# Patient Record
Sex: MALE | Race: WHITE | NOT HISPANIC OR LATINO | Employment: FULL TIME | URBAN - METROPOLITAN AREA
[De-identification: names, ages, dates, MRNs, and addresses within clinical notes are randomized per-mention and may not be internally consistent; named-entity substitution may affect disease eponyms.]

---

## 2021-04-15 ENCOUNTER — LAB (OUTPATIENT)
Dept: LAB | Facility: HOSPITAL | Age: 51
End: 2021-04-15

## 2021-04-15 ENCOUNTER — OFFICE VISIT (OUTPATIENT)
Dept: FAMILY MEDICINE CLINIC | Facility: CLINIC | Age: 51
End: 2021-04-15

## 2021-04-15 VITALS
OXYGEN SATURATION: 98 % | SYSTOLIC BLOOD PRESSURE: 140 MMHG | DIASTOLIC BLOOD PRESSURE: 90 MMHG | TEMPERATURE: 96.8 F | BODY MASS INDEX: 34.75 KG/M2 | HEIGHT: 72 IN | HEART RATE: 86 BPM | WEIGHT: 256.6 LBS

## 2021-04-15 DIAGNOSIS — I10 ESSENTIAL HYPERTENSION: ICD-10-CM

## 2021-04-15 DIAGNOSIS — Z11.59 ENCOUNTER FOR HEPATITIS C SCREENING TEST FOR LOW RISK PATIENT: ICD-10-CM

## 2021-04-15 DIAGNOSIS — Z12.11 SCREEN FOR COLON CANCER: Primary | ICD-10-CM

## 2021-04-15 DIAGNOSIS — Z13.9 ENCOUNTER FOR HEALTH-RELATED SCREENING: ICD-10-CM

## 2021-04-15 LAB
ANION GAP SERPL CALCULATED.3IONS-SCNC: 9 MMOL/L (ref 5–15)
BUN SERPL-MCNC: 13 MG/DL (ref 6–20)
BUN/CREAT SERPL: 16.5 (ref 7–25)
CALCIUM SPEC-SCNC: 9.7 MG/DL (ref 8.6–10.5)
CHLORIDE SERPL-SCNC: 103 MMOL/L (ref 98–107)
CHOLEST SERPL-MCNC: 169 MG/DL (ref 0–200)
CO2 SERPL-SCNC: 27 MMOL/L (ref 22–29)
CREAT SERPL-MCNC: 0.79 MG/DL (ref 0.76–1.27)
GFR SERPL CREATININE-BSD FRML MDRD: 104 ML/MIN/1.73
GLUCOSE SERPL-MCNC: 108 MG/DL (ref 65–99)
HCV AB SER DONR QL: NORMAL
HDLC SERPL-MCNC: 43 MG/DL (ref 40–60)
LDLC SERPL CALC-MCNC: 102 MG/DL (ref 0–100)
LDLC/HDLC SERPL: 2.31 {RATIO}
POTASSIUM SERPL-SCNC: 4.6 MMOL/L (ref 3.5–5.2)
SODIUM SERPL-SCNC: 139 MMOL/L (ref 136–145)
T4 FREE SERPL-MCNC: 1.02 NG/DL (ref 0.93–1.7)
TRIGL SERPL-MCNC: 133 MG/DL (ref 0–150)
TSH SERPL DL<=0.05 MIU/L-ACNC: 2.72 UIU/ML (ref 0.27–4.2)
VLDLC SERPL-MCNC: 24 MG/DL (ref 5–40)

## 2021-04-15 PROCEDURE — 86803 HEPATITIS C AB TEST: CPT

## 2021-04-15 PROCEDURE — 36415 COLL VENOUS BLD VENIPUNCTURE: CPT

## 2021-04-15 PROCEDURE — 99396 PREV VISIT EST AGE 40-64: CPT | Performed by: NURSE PRACTITIONER

## 2021-04-15 PROCEDURE — 80048 BASIC METABOLIC PNL TOTAL CA: CPT

## 2021-04-15 PROCEDURE — 80061 LIPID PANEL: CPT

## 2021-04-15 PROCEDURE — 84443 ASSAY THYROID STIM HORMONE: CPT

## 2021-04-15 PROCEDURE — 84439 ASSAY OF FREE THYROXINE: CPT

## 2021-04-15 RX ORDER — ASPIRIN 81 MG/1
TABLET ORAL
COMMUNITY
Start: 2014-08-22

## 2021-04-15 RX ORDER — OLMESARTAN MEDOXOMIL 20 MG/1
TABLET, FILM COATED ORAL
COMMUNITY
Start: 2014-09-19 | End: 2021-05-27

## 2021-04-15 RX ORDER — FLUTICASONE PROPIONATE 50 MCG
SPRAY, SUSPENSION (ML) NASAL
COMMUNITY
Start: 2014-08-21

## 2021-04-15 RX ORDER — MULTIPLE VITAMINS W/ MINERALS TAB 9MG-400MCG
1 TAB ORAL DAILY
COMMUNITY

## 2021-04-15 RX ORDER — HYDROCHLOROTHIAZIDE 12.5 MG/1
12.5 TABLET ORAL DAILY
Qty: 30 TABLET | Refills: 0 | Status: SHIPPED | OUTPATIENT
Start: 2021-04-15 | End: 2021-05-27 | Stop reason: SDUPTHER

## 2021-04-15 NOTE — PROGRESS NOTES
"Subjective        Oliver Leone is a 50 y.o. male.     Chief Complaint   Patient presents with   • Hypertension     new pt establishment       History of Present Illness  Patient is new to this office. He has history of hypertension in the past and has it now.     Health screening; history of hypertension,  morbid obesity.  He needs colonoscopy. He has went from 296 to 256 over 7 months . He is exercising and eating well. Father had cAd at age 40's. He was  in the past is retired. He dips and occassional cigar use.   He has current eye exam using cheaters. He is current with dental.   Not interested in any other cancer screenings.                   The following portions of the patient's history were reviewed and updated as appropriate: allergies, current medications, past family history, past medical history, past social history, past surgical history and problem list.      Current Outpatient Medications:   •  aspirin (ASPIR) 81 MG EC tablet, ASPIRIN 81 81 MG TBEC, Disp: , Rfl:   •  multivitamin with minerals (MULTIVITAMIN ADULTS PO), Take 1 tablet by mouth Daily., Disp: , Rfl:   •  olmesartan (Benicar) 20 MG tablet, BENICAR 20 MG TABS, Disp: , Rfl:   •  fluticasone (Flonase) 50 MCG/ACT nasal spray, FLONASE 50 MCG/ACT NASAL SUSPENSION, Disp: , Rfl:   •  hydroCHLOROthiazide (HYDRODIURIL) 12.5 MG tablet, Take 1 tablet by mouth Daily., Disp: 30 tablet, Rfl: 0    No results found for this or any previous visit (from the past 4032 hour(s)).      Review of Systems   HENT:        Decreased hearing right ear       Objective     /90   Pulse 86   Temp 96.8 °F (36 °C) (Infrared)   Ht 182.9 cm (72\")   Wt 116 kg (256 lb 9.6 oz)   SpO2 98%   BMI 34.80 kg/m²     Physical Exam  Vitals and nursing note reviewed.   Constitutional:       Appearance: Normal appearance.   HENT:      Head: Normocephalic.      Right Ear: External ear normal.      Left Ear: External ear normal.      Nose: Nose normal.      " Mouth/Throat:      Mouth: Mucous membranes are moist.   Eyes:      Conjunctiva/sclera: Conjunctivae normal.      Pupils: Pupils are equal, round, and reactive to light.   Cardiovascular:      Rate and Rhythm: Normal rate and regular rhythm.      Pulses: Normal pulses.      Heart sounds: Normal heart sounds.   Pulmonary:      Effort: Pulmonary effort is normal.      Breath sounds: Normal breath sounds.   Abdominal:      General: Abdomen is flat. Bowel sounds are normal.      Palpations: Abdomen is soft.   Musculoskeletal:         General: Normal range of motion.      Cervical back: Normal range of motion and neck supple.   Skin:     General: Skin is warm and dry.   Neurological:      General: No focal deficit present.      Mental Status: He is alert and oriented to person, place, and time.   Psychiatric:         Mood and Affect: Mood normal.         Behavior: Behavior normal.         Thought Content: Thought content normal.         Judgment: Judgment normal.         Result Review :                Assessment/Plan    Diagnoses and all orders for this visit:    1. Screen for colon cancer (Primary)  -     Amb referral for Screening Colonoscopy    2. Essential hypertension    3. Encounter for health-related screening  -     Lipid Panel; Future  -     Basic Metabolic Panel; Future  -     TSH; Future  -     T4, free; Future    4. Encounter for hepatitis C screening test for low risk patient  -     Hepatitis C antibody; Future    Other orders  -     hydroCHLOROthiazide (HYDRODIURIL) 12.5 MG tablet; Take 1 tablet by mouth Daily.  Dispense: 30 tablet; Refill: 0      Patient Instructions   Follow up on labs  Monitor blood pressure 3 times a week. Eat healthy and exercise      Follow Up   Return in about 1 month (around 5/15/2021).    Patient was given instructions and counseling regarding his condition or for health maintenance advice. Please see specific information pulled into the AVS if appropriate.     Carey Miles,  APRN    04/15/21

## 2021-05-27 ENCOUNTER — OFFICE VISIT (OUTPATIENT)
Dept: FAMILY MEDICINE CLINIC | Facility: CLINIC | Age: 51
End: 2021-05-27

## 2021-05-27 VITALS
SYSTOLIC BLOOD PRESSURE: 130 MMHG | HEIGHT: 72 IN | DIASTOLIC BLOOD PRESSURE: 90 MMHG | HEART RATE: 72 BPM | WEIGHT: 258 LBS | OXYGEN SATURATION: 97 % | TEMPERATURE: 96.8 F | BODY MASS INDEX: 34.95 KG/M2

## 2021-05-27 DIAGNOSIS — I15.8 OTHER SECONDARY HYPERTENSION: Primary | ICD-10-CM

## 2021-05-27 PROBLEM — Z11.59 ENCOUNTER FOR HEPATITIS C SCREENING TEST FOR LOW RISK PATIENT: Status: RESOLVED | Noted: 2021-04-15 | Resolved: 2021-05-27

## 2021-05-27 PROCEDURE — 99213 OFFICE O/P EST LOW 20 MIN: CPT | Performed by: NURSE PRACTITIONER

## 2021-05-27 RX ORDER — HYDROCHLOROTHIAZIDE 12.5 MG/1
12.5 TABLET ORAL DAILY
Qty: 90 TABLET | Refills: 1 | Status: SHIPPED | OUTPATIENT
Start: 2021-05-27 | End: 2021-11-29 | Stop reason: SDUPTHER

## 2021-05-27 NOTE — PROGRESS NOTES
Subjective        Oliver Leone is a 50 y.o. male.     Chief Complaint   Patient presents with   • Hypertension     1 month f/u       History of Present Illness  Patient is here for one month follow up on hypertension he is checking every 3 days 160/90 down to 130's /70.    Hypertension: he is taking hctz 12.5 mg . He said is high after he runs. He has lost 50 lbs with exercise and eating changes. Denies chest pain with activity.         The following portions of the patient's history were reviewed and updated as appropriate: allergies, current medications, past family history, past medical history, past social history, past surgical history and problem list.      Current Outpatient Medications:   •  aspirin (ASPIR) 81 MG EC tablet, ASPIRIN 81 81 MG TBEC, Disp: , Rfl:   •  fluticasone (Flonase) 50 MCG/ACT nasal spray, FLONASE 50 MCG/ACT NASAL SUSPENSION, Disp: , Rfl:   •  multivitamin with minerals (MULTIVITAMIN ADULTS PO), Take 1 tablet by mouth Daily., Disp: , Rfl:   •  hydroCHLOROthiazide (HYDRODIURIL) 12.5 MG tablet, Take 1 tablet by mouth Daily., Disp: 90 tablet, Rfl: 1    Recent Results (from the past 4032 hour(s))   Lipid Panel    Collection Time: 04/15/21 10:18 AM    Specimen: Blood   Result Value Ref Range    Total Cholesterol 169 0 - 200 mg/dL    Triglycerides 133 0 - 150 mg/dL    HDL Cholesterol 43 40 - 60 mg/dL    LDL Cholesterol  102 (H) 0 - 100 mg/dL    VLDL Cholesterol 24 5 - 40 mg/dL    LDL/HDL Ratio 2.31    Basic Metabolic Panel    Collection Time: 04/15/21 10:18 AM    Specimen: Blood   Result Value Ref Range    Glucose 108 (H) 65 - 99 mg/dL    BUN 13 6 - 20 mg/dL    Creatinine 0.79 0.76 - 1.27 mg/dL    Sodium 139 136 - 145 mmol/L    Potassium 4.6 3.5 - 5.2 mmol/L    Chloride 103 98 - 107 mmol/L    CO2 27.0 22.0 - 29.0 mmol/L    Calcium 9.7 8.6 - 10.5 mg/dL    eGFR Non African Amer 104 >60 mL/min/1.73    BUN/Creatinine Ratio 16.5 7.0 - 25.0    Anion Gap 9.0 5.0 - 15.0 mmol/L   TSH    Collection  "Time: 04/15/21 10:18 AM    Specimen: Blood   Result Value Ref Range    TSH 2.720 0.270 - 4.200 uIU/mL   T4, free    Collection Time: 04/15/21 10:18 AM    Specimen: Blood   Result Value Ref Range    Free T4 1.02 0.93 - 1.70 ng/dL   Hepatitis C antibody    Collection Time: 04/15/21 10:18 AM    Specimen: Blood   Result Value Ref Range    Hepatitis C Ab Non-Reactive Non-Reactive         Review of Systems    Objective     /90   Pulse 72   Temp 96.8 °F (36 °C) (Infrared)   Ht 182.9 cm (72\")   Wt 117 kg (258 lb)   SpO2 97%   BMI 34.99 kg/m²     Physical Exam  Vitals and nursing note reviewed.   Constitutional:       Appearance: Normal appearance.   HENT:      Head: Normocephalic.      Right Ear: External ear normal.      Left Ear: External ear normal.      Nose: Nose normal.      Mouth/Throat:      Mouth: Mucous membranes are moist.   Eyes:      Pupils: Pupils are equal, round, and reactive to light.   Cardiovascular:      Rate and Rhythm: Normal rate and regular rhythm.      Pulses: Normal pulses.      Heart sounds: Normal heart sounds.   Pulmonary:      Effort: Pulmonary effort is normal.      Breath sounds: Normal breath sounds.   Abdominal:      General: Bowel sounds are normal.      Palpations: Abdomen is soft.   Musculoskeletal:      Cervical back: Normal range of motion.   Skin:     General: Skin is warm.   Neurological:      General: No focal deficit present.      Mental Status: He is alert and oriented to person, place, and time.   Psychiatric:         Mood and Affect: Mood normal.         Behavior: Behavior normal.         Thought Content: Thought content normal.         Judgment: Judgment normal.         Result Review :                Assessment/Plan    Diagnoses and all orders for this visit:    1. Other secondary hypertension (Primary)    Other orders  -     hydroCHLOROthiazide (HYDRODIURIL) 12.5 MG tablet; Take 1 tablet by mouth Daily.  Dispense: 90 tablet; Refill: 1      Patient Instructions "   Continue hctz. Monitor blood pressure.   F/u 3 months      Follow Up   Return in about 3 months (around 8/27/2021).    Patient was given instructions and counseling regarding his condition or for health maintenance advice. Please see specific information pulled into the AVS if appropriate.     Carey Miles, APRN    05/27/21

## 2021-07-14 ENCOUNTER — OFFICE VISIT (OUTPATIENT)
Dept: FAMILY MEDICINE CLINIC | Facility: CLINIC | Age: 51
End: 2021-07-14

## 2021-07-14 ENCOUNTER — LAB (OUTPATIENT)
Dept: LAB | Facility: HOSPITAL | Age: 51
End: 2021-07-14

## 2021-07-14 ENCOUNTER — TELEPHONE (OUTPATIENT)
Dept: FAMILY MEDICINE CLINIC | Facility: CLINIC | Age: 51
End: 2021-07-14

## 2021-07-14 VITALS
OXYGEN SATURATION: 95 % | SYSTOLIC BLOOD PRESSURE: 156 MMHG | HEART RATE: 116 BPM | HEIGHT: 72 IN | DIASTOLIC BLOOD PRESSURE: 97 MMHG | BODY MASS INDEX: 34.95 KG/M2 | TEMPERATURE: 100.9 F | WEIGHT: 258 LBS

## 2021-07-14 DIAGNOSIS — R50.9 FEVER, UNSPECIFIED FEVER CAUSE: ICD-10-CM

## 2021-07-14 DIAGNOSIS — R50.9 FEVER, UNSPECIFIED FEVER CAUSE: Primary | ICD-10-CM

## 2021-07-14 DIAGNOSIS — R50.9 HYPERTHERMIA-INDUCED DEFECT: Primary | ICD-10-CM

## 2021-07-14 LAB
ANION GAP SERPL CALCULATED.3IONS-SCNC: 11.6 MMOL/L (ref 5–15)
B PARAPERT DNA SPEC QL NAA+PROBE: NOT DETECTED
B PERT DNA SPEC QL NAA+PROBE: NOT DETECTED
BASOPHILS # BLD AUTO: 0.03 10*3/MM3 (ref 0–0.2)
BASOPHILS NFR BLD AUTO: 0.5 % (ref 0–1.5)
BUN SERPL-MCNC: 15 MG/DL (ref 6–20)
BUN/CREAT SERPL: 18.5 (ref 7–25)
C PNEUM DNA NPH QL NAA+NON-PROBE: NOT DETECTED
CALCIUM SPEC-SCNC: 9.2 MG/DL (ref 8.6–10.5)
CHLORIDE SERPL-SCNC: 100 MMOL/L (ref 98–107)
CO2 SERPL-SCNC: 27.4 MMOL/L (ref 22–29)
CREAT SERPL-MCNC: 0.81 MG/DL (ref 0.76–1.27)
DEPRECATED RDW RBC AUTO: 41.6 FL (ref 37–54)
EOSINOPHIL # BLD AUTO: 0.02 10*3/MM3 (ref 0–0.4)
EOSINOPHIL NFR BLD AUTO: 0.4 % (ref 0.3–6.2)
ERYTHROCYTE [DISTWIDTH] IN BLOOD BY AUTOMATED COUNT: 12.8 % (ref 12.3–15.4)
FLUAV SUBTYP SPEC NAA+PROBE: NOT DETECTED
FLUBV RNA ISLT QL NAA+PROBE: NOT DETECTED
GFR SERPL CREATININE-BSD FRML MDRD: 101 ML/MIN/1.73
GLUCOSE SERPL-MCNC: 89 MG/DL (ref 65–99)
HADV DNA SPEC NAA+PROBE: NOT DETECTED
HCOV 229E RNA SPEC QL NAA+PROBE: NOT DETECTED
HCOV HKU1 RNA SPEC QL NAA+PROBE: NOT DETECTED
HCOV NL63 RNA SPEC QL NAA+PROBE: NOT DETECTED
HCOV OC43 RNA SPEC QL NAA+PROBE: NOT DETECTED
HCT VFR BLD AUTO: 43.2 % (ref 37.5–51)
HGB BLD-MCNC: 14.5 G/DL (ref 13–17.7)
HMPV RNA NPH QL NAA+NON-PROBE: NOT DETECTED
HPIV1 RNA SPEC QL NAA+PROBE: NOT DETECTED
HPIV2 RNA SPEC QL NAA+PROBE: NOT DETECTED
HPIV3 RNA NPH QL NAA+PROBE: NOT DETECTED
HPIV4 P GENE NPH QL NAA+PROBE: NOT DETECTED
IMM GRANULOCYTES # BLD AUTO: 0.02 10*3/MM3 (ref 0–0.05)
IMM GRANULOCYTES NFR BLD AUTO: 0.4 % (ref 0–0.5)
LYMPHOCYTES # BLD AUTO: 0.86 10*3/MM3 (ref 0.7–3.1)
LYMPHOCYTES NFR BLD AUTO: 15.4 % (ref 19.6–45.3)
M PNEUMO IGG SER IA-ACNC: NOT DETECTED
MCH RBC QN AUTO: 30.1 PG (ref 26.6–33)
MCHC RBC AUTO-ENTMCNC: 33.6 G/DL (ref 31.5–35.7)
MCV RBC AUTO: 89.6 FL (ref 79–97)
MONOCYTES # BLD AUTO: 0.93 10*3/MM3 (ref 0.1–0.9)
MONOCYTES NFR BLD AUTO: 16.6 % (ref 5–12)
NEUTROPHILS NFR BLD AUTO: 3.73 10*3/MM3 (ref 1.7–7)
NEUTROPHILS NFR BLD AUTO: 66.7 % (ref 42.7–76)
NRBC BLD AUTO-RTO: 0 /100 WBC (ref 0–0.2)
PLATELET # BLD AUTO: 243 10*3/MM3 (ref 140–450)
PMV BLD AUTO: 9.7 FL (ref 6–12)
POTASSIUM SERPL-SCNC: 3.9 MMOL/L (ref 3.5–5.2)
RBC # BLD AUTO: 4.82 10*6/MM3 (ref 4.14–5.8)
RHINOVIRUS RNA SPEC NAA+PROBE: DETECTED
RSV RNA NPH QL NAA+NON-PROBE: NOT DETECTED
SARS-COV-2 RNA NPH QL NAA+NON-PROBE: DETECTED
SODIUM SERPL-SCNC: 139 MMOL/L (ref 136–145)
WBC # BLD AUTO: 5.59 10*3/MM3 (ref 3.4–10.8)

## 2021-07-14 PROCEDURE — 36415 COLL VENOUS BLD VENIPUNCTURE: CPT

## 2021-07-14 PROCEDURE — 80048 BASIC METABOLIC PNL TOTAL CA: CPT

## 2021-07-14 PROCEDURE — 99214 OFFICE O/P EST MOD 30 MIN: CPT | Performed by: NURSE PRACTITIONER

## 2021-07-14 PROCEDURE — 85025 COMPLETE CBC W/AUTO DIFF WBC: CPT

## 2021-07-14 PROCEDURE — 0202U NFCT DS 22 TRGT SARS-COV-2: CPT

## 2021-07-14 RX ORDER — AZITHROMYCIN 250 MG/1
TABLET, FILM COATED ORAL
Qty: 6 TABLET | Refills: 0 | Status: SHIPPED | OUTPATIENT
Start: 2021-07-14 | End: 2022-06-01

## 2021-07-14 NOTE — TELEPHONE ENCOUNTER
Needs COVID test, was around coworker who tested positive Monday. The patient has fever, cough, vomitting

## 2021-07-14 NOTE — PATIENT INSTRUCTIONS
Follow up on test.   Quarantine   Tylenol for pain and fever  mucinex with fluids water electrolytes  zithromax as prescribed   Gets seen if short of air or symptoms worsen.     
no

## 2021-07-14 NOTE — PROGRESS NOTES
Subjective        Oliver Leone is a 50 y.o. male.     Chief Complaint   Patient presents with   • Generalized Body Aches     x2 days   • Fever   • Cough   • Nasal Congestion       History of Present Illness  Patient is here for fever that started yesterday he has body aches, chills cough, congestion, diarrhea, gets nausea. He has been out of town traveling he did not get covid exposed to covid on Monday.   He is taking extra strength tylenol and mucinex d.   Minor symptoms started on 7/9 thought was allergy or sinus infection.       The following portions of the patient's history were reviewed and updated as appropriate: allergies, current medications, past family history, past medical history, past social history, past surgical history and problem list.      Current Outpatient Medications:   •  aspirin (ASPIR) 81 MG EC tablet, ASPIRIN 81 81 MG TBEC, Disp: , Rfl:   •  fluticasone (Flonase) 50 MCG/ACT nasal spray, FLONASE 50 MCG/ACT NASAL SUSPENSION, Disp: , Rfl:   •  hydroCHLOROthiazide (HYDRODIURIL) 12.5 MG tablet, Take 1 tablet by mouth Daily., Disp: 90 tablet, Rfl: 1  •  multivitamin with minerals (MULTIVITAMIN ADULTS PO), Take 1 tablet by mouth Daily., Disp: , Rfl:   •  azithromycin (Zithromax) 250 MG tablet, Take 2 tablets the first day, then 1 tablet daily for 4 days., Disp: 6 tablet, Rfl: 0    Recent Results (from the past 4032 hour(s))   Lipid Panel    Collection Time: 04/15/21 10:18 AM    Specimen: Blood   Result Value Ref Range    Total Cholesterol 169 0 - 200 mg/dL    Triglycerides 133 0 - 150 mg/dL    HDL Cholesterol 43 40 - 60 mg/dL    LDL Cholesterol  102 (H) 0 - 100 mg/dL    VLDL Cholesterol 24 5 - 40 mg/dL    LDL/HDL Ratio 2.31    Basic Metabolic Panel    Collection Time: 04/15/21 10:18 AM    Specimen: Blood   Result Value Ref Range    Glucose 108 (H) 65 - 99 mg/dL    BUN 13 6 - 20 mg/dL    Creatinine 0.79 0.76 - 1.27 mg/dL    Sodium 139 136 - 145 mmol/L    Potassium 4.6 3.5 - 5.2 mmol/L     "Chloride 103 98 - 107 mmol/L    CO2 27.0 22.0 - 29.0 mmol/L    Calcium 9.7 8.6 - 10.5 mg/dL    eGFR Non African Amer 104 >60 mL/min/1.73    BUN/Creatinine Ratio 16.5 7.0 - 25.0    Anion Gap 9.0 5.0 - 15.0 mmol/L   TSH    Collection Time: 04/15/21 10:18 AM    Specimen: Blood   Result Value Ref Range    TSH 2.720 0.270 - 4.200 uIU/mL   T4, free    Collection Time: 04/15/21 10:18 AM    Specimen: Blood   Result Value Ref Range    Free T4 1.02 0.93 - 1.70 ng/dL   Hepatitis C antibody    Collection Time: 04/15/21 10:18 AM    Specimen: Blood   Result Value Ref Range    Hepatitis C Ab Non-Reactive Non-Reactive         Review of Systems    Objective     /97 (BP Location: Left arm, Patient Position: Sitting, Cuff Size: Adult)   Pulse 116   Temp (!) 100.9 °F (38.3 °C) (Oral)   Ht 182.9 cm (72\")   Wt 117 kg (258 lb)   SpO2 95%   BMI 34.99 kg/m²     Physical Exam  Vitals and nursing note reviewed.   Constitutional:       General: He is not in acute distress.     Appearance: Normal appearance.   HENT:      Head: Normocephalic.      Right Ear: External ear normal.      Left Ear: External ear normal.      Nose: Nasal tenderness, mucosal edema and rhinorrhea present. No congestion.      Right Turbinates: Enlarged and swollen.      Left Turbinates: Enlarged and swollen.      Right Sinus: Maxillary sinus tenderness and frontal sinus tenderness present.      Left Sinus: Maxillary sinus tenderness and frontal sinus tenderness present.   Pulmonary:      Effort: Pulmonary effort is normal.      Breath sounds: Examination of the right-upper field reveals rhonchi. Examination of the right-middle field reveals rhonchi. Examination of the right-lower field reveals rhonchi. Rhonchi present.   Abdominal:      General: Abdomen is flat. Bowel sounds are normal.      Palpations: Abdomen is soft.      Tenderness: There is generalized abdominal tenderness.   Neurological:      Mental Status: He is alert.   Psychiatric:         Attention " and Perception: Attention normal.         Mood and Affect: Mood normal.         Speech: Speech normal.         Behavior: Behavior normal.         Thought Content: Thought content normal.         Cognition and Memory: Cognition normal.         Judgment: Judgment normal.         Result Review :                Assessment/Plan {CC Problem List  Visit Diagnosis  ROS  Review (Popup)  Health Maintenance  Quality  BestPractice  Medications  SmartSets  SnapShot Encounters  Media :23}   Diagnoses and all orders for this visit:    1. Fever, unspecified fever cause (Primary)  -     COVID-19,[facility] ROUTINE, NP/OP SWAB IN TRANSPORT MEDIA OR ESWAB 72 HR TAT - Swab, Nasopharynx; Future  -     Respiratory Panel, PCR (WITHOUT COVID) - Swab, Nasopharynx; Future  -     CBC & Differential; Future  -     Basic Metabolic Panel; Future    Other orders  -     azithromycin (Zithromax) 250 MG tablet; Take 2 tablets the first day, then 1 tablet daily for 4 days.  Dispense: 6 tablet; Refill: 0      Patient Instructions   Follow up on test.   Quarantine   Tylenol for pain and fever  mucinex with fluids water electrolytes  zithromax as prescribed   Gets seen if short of air or symptoms worsen.         Follow Up   No follow-ups on file.    Patient was given instructions and counseling regarding his condition or for health maintenance advice. Please see specific information pulled into the AVS if appropriate.     Carey Miles, APRN    07/14/21

## 2021-07-15 ENCOUNTER — TELEPHONE (OUTPATIENT)
Dept: FAMILY MEDICINE CLINIC | Facility: CLINIC | Age: 51
End: 2021-07-15

## 2021-07-16 PROBLEM — U07.1 COVID-19: Status: ACTIVE | Noted: 2021-07-16

## 2021-07-16 RX ORDER — METHYLPREDNISOLONE SODIUM SUCCINATE 125 MG/2ML
125 INJECTION, POWDER, LYOPHILIZED, FOR SOLUTION INTRAMUSCULAR; INTRAVENOUS AS NEEDED
OUTPATIENT
Start: 2021-07-19

## 2021-07-16 RX ORDER — SODIUM CHLORIDE 9 MG/ML
50 INJECTION, SOLUTION INTRAVENOUS ONCE
OUTPATIENT
Start: 2021-07-19

## 2021-07-16 RX ORDER — DIPHENHYDRAMINE HYDROCHLORIDE 50 MG/ML
50 INJECTION INTRAMUSCULAR; INTRAVENOUS AS NEEDED
OUTPATIENT
Start: 2021-07-19

## 2021-07-16 RX ORDER — EPINEPHRINE 1 MG/ML
0.3 INJECTION, SOLUTION INTRAMUSCULAR; SUBCUTANEOUS AS NEEDED
OUTPATIENT
Start: 2021-07-19

## 2021-08-20 ENCOUNTER — TELEPHONE (OUTPATIENT)
Dept: FAMILY MEDICINE CLINIC | Facility: CLINIC | Age: 51
End: 2021-08-20

## 2021-08-20 NOTE — TELEPHONE ENCOUNTER
Per CDC guidelines: no clinical studies of ivermectin have reported a clinical benefit for ivermectin in covid 19 patients.There is insufficient evidence for prescribing this .     I do not prescribe this medication.

## 2021-08-20 NOTE — TELEPHONE ENCOUNTER
Caller: Oliver Leone    Relationship: Self    Best call back number: [phone]     PATIENT IS REQUESTING THE FOLLOWING MEDICATION FOR FATIGUE. PATIENT SPOKE TO A FRIEND OF HIS THAT IS A MD FOR A PRACTICE.     IVERMECTIN 3MG 4 TIMES A DAY, FOR 5 DAYS.       If a prescription is needed, what is your preferred pharmacy and phone number: [facility] [address] [place] [address] [address] [facility] [phone] [facility] [phone] FX

## 2021-11-29 RX ORDER — HYDROCHLOROTHIAZIDE 12.5 MG/1
12.5 TABLET ORAL DAILY
Qty: 90 TABLET | Refills: 1 | Status: SHIPPED | OUTPATIENT
Start: 2021-11-29 | End: 2022-06-27

## 2021-11-29 NOTE — TELEPHONE ENCOUNTER
Caller: Oliver Leone    Relationship: Self    Best call back number: [phone] (H)    Requested Prescriptions:   hydroCHLOROthiazide (HYDRODIURIL) 12.5 MG tablet     Pharmacy where request should be sent: [address] - [phone] [place] [phone]          Additional details provided by patient: PATIENT CALLED TO REQUEST A MEDICATION REFILL ON HIS MEDICATION. PATIENT STATES THAT HE HAS A 1 DAY SUPPLY LEFT.            Does the patient have less than a 3 day supply:  [x] Yes  [] No    Buffy Berger Rep   11/29/21 14:21 EST         THANKS

## 2022-06-01 ENCOUNTER — OFFICE VISIT (OUTPATIENT)
Dept: FAMILY MEDICINE CLINIC | Facility: CLINIC | Age: 52
End: 2022-06-01

## 2022-06-01 VITALS
TEMPERATURE: 98.4 F | HEIGHT: 72 IN | SYSTOLIC BLOOD PRESSURE: 130 MMHG | OXYGEN SATURATION: 96 % | DIASTOLIC BLOOD PRESSURE: 80 MMHG | WEIGHT: 251 LBS | BODY MASS INDEX: 34 KG/M2 | HEART RATE: 105 BPM

## 2022-06-01 DIAGNOSIS — R50.9 FEVER, UNSPECIFIED FEVER CAUSE: ICD-10-CM

## 2022-06-01 DIAGNOSIS — J06.9 UPPER RESPIRATORY TRACT INFECTION, UNSPECIFIED TYPE: Primary | ICD-10-CM

## 2022-06-01 LAB
EXPIRATION DATE: NORMAL
FLUAV AG UPPER RESP QL IA.RAPID: NOT DETECTED
FLUBV AG UPPER RESP QL IA.RAPID: NOT DETECTED
INTERNAL CONTROL: NORMAL
Lab: NORMAL
SARS-COV-2 AG UPPER RESP QL IA.RAPID: NOT DETECTED

## 2022-06-01 PROCEDURE — 99213 OFFICE O/P EST LOW 20 MIN: CPT | Performed by: NURSE PRACTITIONER

## 2022-06-01 PROCEDURE — 87428 SARSCOV & INF VIR A&B AG IA: CPT | Performed by: NURSE PRACTITIONER

## 2022-06-01 RX ORDER — AZITHROMYCIN 250 MG/1
TABLET, FILM COATED ORAL
Qty: 6 TABLET | Refills: 0 | Status: SHIPPED | OUTPATIENT
Start: 2022-06-01 | End: 2022-09-27

## 2022-06-01 NOTE — PROGRESS NOTES
"Subjective   {CC  Problem List  Visit Diagnosis   Encounters  Notes  Medications  Labs  Result Review Imaging  Media :23}     Oliver Leone is a 51 y.o. male.     Chief Complaint   Patient presents with   • Cough     Cough, fever, muscle cramping, fatigue, sore throat       History of Present Illness  Patient is here for 101 fever 2 days ago then achy sore throat with sinus drainage, and cough.     The following portions of the patient's history were reviewed and updated as appropriate: allergies, current medications, past family history, past medical history, past social history, past surgical history and problem list.      Current Outpatient Medications:   •  aspirin (aspirin) 81 MG EC tablet, ASPIRIN 81 81 MG TBEC, Disp: , Rfl:   •  fluticasone (FLONASE) 50 MCG/ACT nasal spray, FLONASE 50 MCG/ACT NASAL SUSPENSION, Disp: , Rfl:   •  hydroCHLOROthiazide (HYDRODIURIL) 12.5 MG tablet, Take 1 tablet by mouth Daily., Disp: 90 tablet, Rfl: 1  •  multivitamin with minerals tablet tablet, Take 1 tablet by mouth Daily., Disp: , Rfl:   •  azithromycin (Zithromax) 250 MG tablet, Take 2 tablets the first day, then 1 tablet daily for 4 days., Disp: 6 tablet, Rfl: 0    No results found for this or any previous visit (from the past 4032 hour(s)).      Review of Systems    Objective     /80   Pulse 105   Temp 98.4 °F (36.9 °C) (Oral)   Ht 182.9 cm (72\")   Wt 114 kg (251 lb)   SpO2 96%   BMI 34.04 kg/m²     Physical Exam  Vitals and nursing note reviewed.   Constitutional:       Appearance: Normal appearance.   HENT:      Head: Normocephalic.      Right Ear: External ear normal. A middle ear effusion is present.      Left Ear: External ear normal. A middle ear effusion is present.      Mouth/Throat:      Mouth: Mucous membranes are moist.      Pharynx: Oropharynx is clear. Posterior oropharyngeal erythema present.      Tonsils: No tonsillar exudate or tonsillar abscesses.   Cardiovascular:      Rate and " Rhythm: Normal rate and regular rhythm.      Pulses: Normal pulses.      Heart sounds: Normal heart sounds.   Pulmonary:      Effort: Pulmonary effort is normal.      Breath sounds: Normal breath sounds.   Abdominal:      General: Bowel sounds are normal.      Palpations: Abdomen is soft.   Musculoskeletal:      Cervical back: Neck supple.   Skin:     General: Skin is warm and dry.      Capillary Refill: Capillary refill takes less than 2 seconds.   Neurological:      General: No focal deficit present.      Mental Status: He is alert and oriented to person, place, and time.   Psychiatric:         Mood and Affect: Mood normal.         Behavior: Behavior normal.         Thought Content: Thought content normal.         Judgment: Judgment normal.         Result Review :                Assessment & Plan    There are no diagnoses linked to this encounter.  Patient Instructions   Take the zithromax. Muccinex. Saline or flonase treat symptoms. Follow up if symptoms worsen.       Follow Up   Return if symptoms worsen or fail to improve.    Patient was given instructions and counseling regarding his condition or for health maintenance advice. Please see specific information pulled into the AVS if appropriate.     Carey Miles, CALLIE    06/01/22

## 2022-06-27 RX ORDER — HYDROCHLOROTHIAZIDE 12.5 MG/1
TABLET ORAL
Qty: 90 TABLET | Refills: 0 | Status: SHIPPED | OUTPATIENT
Start: 2022-06-27 | End: 2022-09-27

## 2022-09-27 DIAGNOSIS — I10 ESSENTIAL HYPERTENSION: Primary | ICD-10-CM

## 2022-09-27 RX ORDER — HYDROCHLOROTHIAZIDE 12.5 MG/1
TABLET ORAL
Qty: 30 TABLET | Refills: 0 | Status: SHIPPED | OUTPATIENT
Start: 2022-09-27 | End: 2023-01-26 | Stop reason: ALTCHOICE

## 2022-10-10 ENCOUNTER — OFFICE VISIT (OUTPATIENT)
Dept: FAMILY MEDICINE CLINIC | Facility: CLINIC | Age: 52
End: 2022-10-10

## 2022-10-10 VITALS
OXYGEN SATURATION: 97 % | TEMPERATURE: 96.6 F | RESPIRATION RATE: 18 BRPM | HEIGHT: 72 IN | BODY MASS INDEX: 34.4 KG/M2 | HEART RATE: 96 BPM | WEIGHT: 254 LBS

## 2022-10-10 DIAGNOSIS — M54.50 LUMBAR SPINE PAIN: Primary | ICD-10-CM

## 2022-10-10 DIAGNOSIS — I15.8 OTHER SECONDARY HYPERTENSION: ICD-10-CM

## 2022-10-10 DIAGNOSIS — M54.16 RADICULOPATHY OF LUMBAR REGION: Chronic | ICD-10-CM

## 2022-10-10 PROCEDURE — 99213 OFFICE O/P EST LOW 20 MIN: CPT | Performed by: NURSE PRACTITIONER

## 2022-10-10 RX ORDER — IBUPROFEN 600 MG/1
600 TABLET ORAL EVERY 6 HOURS PRN
Qty: 60 TABLET | Refills: 0 | Status: SHIPPED | OUTPATIENT
Start: 2022-10-10

## 2022-10-10 NOTE — PROGRESS NOTES
Subjective        Oliver Leone is a 51 y.o. male.     Chief Complaint   Patient presents with   • Back Pain       History of Present Illness  Patient is here for left lower back pain. Started 2 months ago then was moving heavy objects 2 weeks ago and now wicked bad. Morning and night worse.  He has not been able to run or work out since. He said sitting hurts.   He is not taking anything using hot tub and stretching. .  No history of back surgery.   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   Right flank and coccyx radiates into groin. He said he gets knots.   No back surgery.   No bowel or bladder incontinence.     Answers for HPI/ROS submitted by the patient on 10/7/2022  What is the primary reason for your visit?: Back Pain    Hypertension: taking hctz 12.5 mg daily. He monitors at home never high is in moderate to severe pain today.      The following portions of the patient's history were reviewed and updated as appropriate: allergies, current medications, past family history, past medical history, past social history, past surgical history and problem list.      Current Outpatient Medications:   •  aspirin (aspirin) 81 MG EC tablet, ASPIRIN 81 81 MG TBEC, Disp: , Rfl:   •  fluticasone (FLONASE) 50 MCG/ACT nasal spray, FLONASE 50 MCG/ACT NASAL SUSPENSION, Disp: , Rfl:   •  hydroCHLOROthiazide (HYDRODIURIL) 12.5 MG tablet, Take 1 tablet by mouth once daily, Disp: 30 tablet, Rfl: 0  •  multivitamin with minerals tablet tablet, Take 1 tablet by mouth Daily., Disp: , Rfl:   •  ibuprofen (ADVIL,MOTRIN) 600 MG tablet, Take 1 tablet by mouth Every 6 (Six) Hours As Needed for Mild Pain., Disp: 60 tablet, Rfl: 0    Recent Results (from the past 4032 hour(s))   POCT SARS-CoV-2 Antigen GALE + Flu    Collection Time: 06/01/22  2:59 PM    Specimen: Swab   Result Value Ref Range    SARS Antigen Not Detected Not Detected, Presumptive Negative    Influenza A Antigen GALE Not Detected Not Detected    Influenza B  "Antigen GALE Not Detected Not Detected    Internal Control Passed Passed    Lot Number [id number]     Expiration Date 10,282,022          Review of Systems    Objective     Pulse 96   Temp 96.6 °F (35.9 °C) (Infrared)   Resp 18   Ht 182.9 cm (72\")   Wt 115 kg (254 lb)   SpO2 97%   BMI 34.45 kg/m²     Physical Exam  Vitals and nursing note reviewed.   Constitutional:       Appearance: Normal appearance.   HENT:      Head: Normocephalic.      Right Ear: External ear normal.      Nose: Nose normal.      Mouth/Throat:      Mouth: Mucous membranes are moist.   Eyes:      Pupils: Pupils are equal, round, and reactive to light.   Cardiovascular:      Rate and Rhythm: Normal rate and regular rhythm.      Pulses: Normal pulses.      Heart sounds: Normal heart sounds.   Pulmonary:      Breath sounds: Normal breath sounds.   Abdominal:      Palpations: Abdomen is soft.   Musculoskeletal:      Lumbar back: Bony tenderness present. No swelling or edema. Normal range of motion. Negative right straight leg raise test and negative left straight leg raise test.        Back:    Skin:     General: Skin is warm.      Capillary Refill: Capillary refill takes less than 2 seconds.   Neurological:      General: No focal deficit present.      Mental Status: He is alert and oriented to person, place, and time.   Psychiatric:         Mood and Affect: Mood normal.         Behavior: Behavior normal.         Thought Content: Thought content normal.         Judgment: Judgment normal.         Result Review :                Assessment & Plan    Diagnoses and all orders for this visit:    1. Lumbar spine pain (Primary)  Comments:  discussed stretches, ice heat pain patches.   Orders:  -     XR Spine Lumbar Complete With Flex & Ext; Future    2. Radiculopathy of lumbar region  Comments:  prescribed steroids.   Orders:  -     XR Spine Lumbar Complete With Flex & Ext; Future    3. Other secondary hypertension  Comments:  stable.     Other orders  - "     ibuprofen (ADVIL,MOTRIN) 600 MG tablet; Take 1 tablet by mouth Every 6 (Six) Hours As Needed for Mild Pain.  Dispense: 60 tablet; Refill: 0      Patient Instructions   Drink water with ibuprofen.  Eat with steroids.   Do the stretches.   Use pain patches otc or icy hot arynica        Follow Up   Return in about 1 month (around 11/10/2022).    Patient was given instructions and counseling regarding his condition or for health maintenance advice. Please see specific information pulled into the AVS if appropriate.     Carey Miles, APRN    10/10/22

## 2022-10-10 NOTE — PATIENT INSTRUCTIONS
Drink water with ibuprofen.  Eat with steroids.   Do the stretches.   Use pain patches otc or icy hot arynica

## 2023-01-17 NOTE — TELEPHONE ENCOUNTER
Caller: [facility]      Relationship: Lab    Best call back number: [phone]    What orders are you requesting (i.e. lab or imaging): COVID-19 INFUSION MONOCLONAL ANTIBODY TREATMENT    Where will you receive your lab/imaging services: [facility]    Additional notes: TIMOTHY FROM THE LAB CALLED TO STATE THAT THE ORDER FOR THE PATIENT TO HAVE THE COVID-19 INFUSION MONOCLONAL ANTIBODY TREATMENT IS NOT DONE WITH THEM. THE PROCEDURE IS COMPLETED THROUGH [facility]. THEIR PHONE NUMBER [phone] AND THE ORDER NEEDS TO BE SENT TO THEM.   
Caller: Oliver Leone    Relationship: Self    Best call back number: [phone] (H)    Medication needed:   ADULT MASK & ALBUTEROL SOLUTION TO GO IN A NEBULIZER     When do you need the refill by: ASAP    What additional details did the patient provide when requesting the medication: PATIENT CALLED AND STATES THAT HE HAS COVID AND WOKE UP WITH DIFFICULTY BREATHING AND HIS WIFE GAVE HIM A NEBULIZER TREATMENT TO HELP HIM BREATH.  PATIENT IS REQUESTING A PRESCRIPTION BE SENT TO HIS PHARMACY.    Does the patient have less than a 3 day supply:  [x] Yes  [] No    What is the patient's preferred pharmacy:      [facility] - [place], [place] - [address] - [phone]  - [phone] FX        THANKS  
Spoke with ambulatory, order faxed  
Spoke with pt, informed him to go to [facility] or ED if he feels SOA or feels like he needs a breathing tx due to providers being out of office today.   
16-Jan-2023 18:03

## 2023-01-18 ENCOUNTER — TELEPHONE (OUTPATIENT)
Dept: FAMILY MEDICINE CLINIC | Facility: CLINIC | Age: 53
End: 2023-01-18
Payer: COMMERCIAL

## 2023-01-18 NOTE — TELEPHONE ENCOUNTER
Ok hub to read:    RICH to schedule follow up visit per Carey for med refills. At his October 2022 appt, Carey wanted him to return in one month.

## 2023-01-26 ENCOUNTER — OFFICE VISIT (OUTPATIENT)
Dept: FAMILY MEDICINE CLINIC | Facility: CLINIC | Age: 53
End: 2023-01-26
Payer: COMMERCIAL

## 2023-01-26 VITALS
OXYGEN SATURATION: 97 % | TEMPERATURE: 98.7 F | HEIGHT: 72 IN | WEIGHT: 258 LBS | BODY MASS INDEX: 34.95 KG/M2 | SYSTOLIC BLOOD PRESSURE: 157 MMHG | DIASTOLIC BLOOD PRESSURE: 93 MMHG | HEART RATE: 87 BPM

## 2023-01-26 DIAGNOSIS — I15.8 OTHER SECONDARY HYPERTENSION: Primary | ICD-10-CM

## 2023-01-26 DIAGNOSIS — F41.9 ANXIETY: Chronic | ICD-10-CM

## 2023-01-26 DIAGNOSIS — Z12.11 COLON CANCER SCREENING: ICD-10-CM

## 2023-01-26 PROCEDURE — 99213 OFFICE O/P EST LOW 20 MIN: CPT | Performed by: NURSE PRACTITIONER

## 2023-01-26 RX ORDER — AMLODIPINE BESYLATE 5 MG/1
5 TABLET ORAL DAILY
Qty: 30 TABLET | Refills: 0 | Status: SHIPPED | OUTPATIENT
Start: 2023-01-26 | End: 2023-02-24 | Stop reason: SDUPTHER

## 2023-01-26 NOTE — PROGRESS NOTES
"Subjective        Oliver Leone is a 52 y.o. male.     Chief Complaint   Patient presents with   • Hypertension     Elevated BP, headaches       History of Present Illness  Patient is here for elevated blood pressure.   He was on hctz. He stopped taking it. He walks for exercise.   He is eating \better using massage therapy .   Getting headaches will high blood pressure.     The following portions of the patient's history were reviewed and updated as appropriate: allergies, current medications, past family history, past medical history, past social history, past surgical history and problem list.      Current Outpatient Medications:   •  aspirin (aspirin) 81 MG EC tablet, ASPIRIN 81 81 MG TBEC, Disp: , Rfl:   •  fluticasone (FLONASE) 50 MCG/ACT nasal spray, FLONASE 50 MCG/ACT NASAL SUSPENSION, Disp: , Rfl:   •  ibuprofen (ADVIL,MOTRIN) 600 MG tablet, Take 1 tablet by mouth Every 6 (Six) Hours As Needed for Mild Pain., Disp: 60 tablet, Rfl: 0  •  multivitamin with minerals tablet tablet, Take 1 tablet by mouth Daily., Disp: , Rfl:   •  amLODIPine (NORVASC) 5 MG tablet, Take 1 tablet by mouth Daily., Disp: 30 tablet, Rfl: 0    No results found for this or any previous visit (from the past 4032 hour(s)).      Review of Systems    Objective     /93   Pulse 87   Temp 98.7 °F (37.1 °C) (Infrared)   Ht 182.9 cm (72\")   Wt 117 kg (258 lb)   SpO2 97%   BMI 34.99 kg/m²     Physical Exam  Vitals and nursing note reviewed.   Constitutional:       Appearance: He is obese.   HENT:      Head: Normocephalic.      Right Ear: External ear normal.      Left Ear: External ear normal.      Nose: Nose normal.      Mouth/Throat:      Mouth: Mucous membranes are moist.   Eyes:      Pupils: Pupils are equal, round, and reactive to light.   Cardiovascular:      Rate and Rhythm: Normal rate and regular rhythm.      Heart sounds: Normal heart sounds.   Pulmonary:      Effort: Pulmonary effort is normal.      Breath sounds: Normal " breath sounds.   Abdominal:      General: Bowel sounds are normal.      Palpations: Abdomen is soft.   Musculoskeletal:      Cervical back: Neck supple.   Skin:     General: Skin is warm.      Capillary Refill: Capillary refill takes less than 2 seconds.   Neurological:      General: No focal deficit present.      Mental Status: He is alert and oriented to person, place, and time.   Psychiatric:         Mood and Affect: Mood normal.         Behavior: Behavior normal.         Thought Content: Thought content normal.         Result Review :                Assessment & Plan    Diagnoses and all orders for this visit:    1. Other secondary hypertension (Primary)  Comments:  start amlodipine monitor blood pressure    2. Anxiety  Comments:  discussed accu puncture.     3. Colon cancer screening  Comments:  cologuard ordered.   Orders:  -     Cologuard - Stool, Per Rectum; Future    Other orders  -     amLODIPine (NORVASC) 5 MG tablet; Take 1 tablet by mouth Daily.  Dispense: 30 tablet; Refill: 0      Patient Instructions   Limit salt and processed foods.  Exercise  Eat healthy  Start the amlodipine 5 mg   Monitor blood pressure.       Follow Up   Return in about 1 month (around 2/26/2023).    Patient was given instructions and counseling regarding his condition or for health maintenance advice. Please see specific information pulled into the AVS if appropriate.     Carey Miles, APRN    01/26/23      Answers for HPI/ROS submitted by the patient on 1/26/2023  What is the primary reason for your visit?: High Blood Pressure

## 2023-01-26 NOTE — PATIENT INSTRUCTIONS
Limit salt and processed foods.  Exercise  Eat healthy  Start the amlodipine 5 mg   Monitor blood pressure.

## 2023-01-27 ENCOUNTER — TELEPHONE (OUTPATIENT)
Dept: FAMILY MEDICINE CLINIC | Facility: CLINIC | Age: 53
End: 2023-01-27

## 2023-01-27 DIAGNOSIS — Z12.11 COLON CANCER SCREENING: Primary | ICD-10-CM

## 2023-02-24 RX ORDER — AMLODIPINE BESYLATE 5 MG/1
5 TABLET ORAL DAILY
Qty: 30 TABLET | Refills: 0 | Status: SHIPPED | OUTPATIENT
Start: 2023-02-24 | End: 2023-03-10 | Stop reason: SDUPTHER

## 2023-02-24 NOTE — TELEPHONE ENCOUNTER
HAS UPCOMING APPT BUT RUNNING OUT OF MEDS THIS WEEKEND     CAN YOU SEND EMERGENCY SUPPLY     Caller: Oliver Leone    Relationship: Self    Best call back number:   [phone] (Mobile)    Requested Prescriptions:   Requested Prescriptions     Pending Prescriptions Disp Refills   • amLODIPine (NORVASC) 5 MG tablet 30 tablet 0     Sig: Take 1 tablet by mouth Daily.        Pharmacy where request should be sent: [facility] - [place], [place] - [address] - [phone] [facility] [phone] FX     Additional details provided by patient:     Does the patient have less than a 3 day supply:  [x] Yes  [] No    Would you like a call back once the refill request has been completed: [] Yes [] No    If the office needs to give you a call back, can they leave a voicemail: [] Yes [] No    Buffy Ray Rep   02/24/23 14:58 EST

## 2023-03-10 ENCOUNTER — OFFICE VISIT (OUTPATIENT)
Dept: FAMILY MEDICINE CLINIC | Facility: CLINIC | Age: 53
End: 2023-03-10
Payer: COMMERCIAL

## 2023-03-10 VITALS
HEART RATE: 100 BPM | BODY MASS INDEX: 34.4 KG/M2 | OXYGEN SATURATION: 98 % | WEIGHT: 254 LBS | SYSTOLIC BLOOD PRESSURE: 121 MMHG | HEIGHT: 72 IN | DIASTOLIC BLOOD PRESSURE: 83 MMHG | TEMPERATURE: 98.1 F

## 2023-03-10 DIAGNOSIS — I15.8 OTHER SECONDARY HYPERTENSION: Primary | ICD-10-CM

## 2023-03-10 PROCEDURE — 99213 OFFICE O/P EST LOW 20 MIN: CPT | Performed by: NURSE PRACTITIONER

## 2023-03-10 RX ORDER — AMLODIPINE BESYLATE 5 MG/1
5 TABLET ORAL DAILY
Qty: 90 TABLET | Refills: 1 | Status: SHIPPED | OUTPATIENT
Start: 2023-03-10

## 2023-03-10 NOTE — PROGRESS NOTES
"Subjective        Oliver Leone is a 52 y.o. male.     Chief Complaint   Patient presents with   • Hypertension     1 month f/u       History of Present Illness  Patient is here for management of his hypertension;     Hypertension 140/90's. Some lower taking amlodipine 5 mg .  Normal in office. Will continue current dose.        The following portions of the patient's history were reviewed and updated as appropriate: allergies, current medications, past family history, past medical history, past social history, past surgical history and problem list.      Current Outpatient Medications:   •  amLODIPine (NORVASC) 5 MG tablet, Take 1 tablet by mouth Daily., Disp: 30 tablet, Rfl: 0  •  aspirin (aspirin) 81 MG EC tablet, ASPIRIN 81 81 MG TBEC, Disp: , Rfl:   •  fluticasone (FLONASE) 50 MCG/ACT nasal spray, FLONASE 50 MCG/ACT NASAL SUSPENSION, Disp: , Rfl:   •  ibuprofen (ADVIL,MOTRIN) 600 MG tablet, Take 1 tablet by mouth Every 6 (Six) Hours As Needed for Mild Pain., Disp: 60 tablet, Rfl: 0  •  multivitamin with minerals tablet tablet, Take 1 tablet by mouth Daily., Disp: , Rfl:     No results found for this or any previous visit (from the past 4032 hour(s)).      Review of Systems    Objective     /83 (BP Location: Left arm, Patient Position: Sitting, Cuff Size: Adult)   Pulse 100   Temp 98.1 °F (36.7 °C) (Infrared)   Ht 182.9 cm (72\")   Wt 115 kg (254 lb)   SpO2 98%   BMI 34.45 kg/m²     Physical Exam  Vitals and nursing note reviewed.   Constitutional:       Appearance: Normal appearance.   HENT:      Head: Normocephalic.      Right Ear: External ear normal.      Left Ear: External ear normal.      Nose: Nose normal.      Mouth/Throat:      Mouth: Mucous membranes are moist.   Cardiovascular:      Rate and Rhythm: Normal rate and regular rhythm.      Pulses: Normal pulses.      Heart sounds: Normal heart sounds.   Pulmonary:      Effort: Pulmonary effort is normal.      Breath sounds: Normal breath " sounds.   Abdominal:      Palpations: Abdomen is soft.   Skin:     General: Skin is warm.   Neurological:      General: No focal deficit present.      Mental Status: He is alert and oriented to person, place, and time.   Psychiatric:         Mood and Affect: Mood normal.         Behavior: Behavior normal.         Thought Content: Thought content normal.         Result Review :                Assessment & Plan    Diagnoses and all orders for this visit:    1. Other secondary hypertension (Primary)  Comments:  continue current medication.      There are no Patient Instructions on file for this visit.    Follow Up   No follow-ups on file.    Patient was given instructions and counseling regarding his condition or for health maintenance advice. Please see specific information pulled into the AVS if appropriate.     Carey Miles, APRN    03/10/23

## 2023-06-09 ENCOUNTER — OFFICE (OUTPATIENT)
Dept: URBAN - METROPOLITAN AREA CLINIC 64 | Facility: CLINIC | Age: 53
End: 2023-06-09

## 2023-06-09 VITALS
SYSTOLIC BLOOD PRESSURE: 139 MMHG | HEART RATE: 90 BPM | WEIGHT: 258 LBS | DIASTOLIC BLOOD PRESSURE: 90 MMHG | HEIGHT: 72 IN

## 2023-06-09 DIAGNOSIS — K64.4 RESIDUAL HEMORRHOIDAL SKIN TAGS: ICD-10-CM

## 2023-06-09 PROCEDURE — 99204 OFFICE O/P NEW MOD 45 MIN: CPT | Performed by: NURSE PRACTITIONER

## 2023-08-04 ENCOUNTER — ON CAMPUS - OUTPATIENT (OUTPATIENT)
Dept: URBAN - METROPOLITAN AREA HOSPITAL 2 | Facility: HOSPITAL | Age: 53
End: 2023-08-04
Payer: COMMERCIAL

## 2023-08-04 VITALS
RESPIRATION RATE: 16 BRPM | HEART RATE: 101 BPM | OXYGEN SATURATION: 99 % | DIASTOLIC BLOOD PRESSURE: 78 MMHG | RESPIRATION RATE: 19 BRPM | TEMPERATURE: 97.8 F | OXYGEN SATURATION: 100 % | SYSTOLIC BLOOD PRESSURE: 123 MMHG | HEART RATE: 82 BPM | DIASTOLIC BLOOD PRESSURE: 67 MMHG | HEART RATE: 84 BPM | HEART RATE: 85 BPM | HEART RATE: 92 BPM | SYSTOLIC BLOOD PRESSURE: 106 MMHG | WEIGHT: 248 LBS | RESPIRATION RATE: 18 BRPM | HEIGHT: 72 IN | DIASTOLIC BLOOD PRESSURE: 79 MMHG | SYSTOLIC BLOOD PRESSURE: 104 MMHG | DIASTOLIC BLOOD PRESSURE: 88 MMHG | SYSTOLIC BLOOD PRESSURE: 113 MMHG | RESPIRATION RATE: 15 BRPM | OXYGEN SATURATION: 97 % | DIASTOLIC BLOOD PRESSURE: 76 MMHG | HEART RATE: 81 BPM | DIASTOLIC BLOOD PRESSURE: 86 MMHG | RESPIRATION RATE: 14 BRPM | HEART RATE: 80 BPM | SYSTOLIC BLOOD PRESSURE: 119 MMHG | SYSTOLIC BLOOD PRESSURE: 121 MMHG | DIASTOLIC BLOOD PRESSURE: 73 MMHG | SYSTOLIC BLOOD PRESSURE: 107 MMHG

## 2023-08-04 DIAGNOSIS — K57.30 DIVERTICULOSIS OF LARGE INTESTINE WITHOUT PERFORATION OR ABS: ICD-10-CM

## 2023-08-04 DIAGNOSIS — Z12.11 ENCOUNTER FOR SCREENING FOR MALIGNANT NEOPLASM OF COLON: ICD-10-CM

## 2023-08-04 PROCEDURE — 45378 DIAGNOSTIC COLONOSCOPY: CPT | Mod: 33 | Performed by: INTERNAL MEDICINE

## 2023-09-15 ENCOUNTER — OFFICE VISIT (OUTPATIENT)
Dept: FAMILY MEDICINE CLINIC | Facility: CLINIC | Age: 53
End: 2023-09-15
Payer: COMMERCIAL

## 2023-09-15 ENCOUNTER — LAB (OUTPATIENT)
Dept: LAB | Facility: HOSPITAL | Age: 53
End: 2023-09-15
Payer: COMMERCIAL

## 2023-09-15 VITALS
HEART RATE: 101 BPM | OXYGEN SATURATION: 98 % | BODY MASS INDEX: 34.54 KG/M2 | SYSTOLIC BLOOD PRESSURE: 130 MMHG | WEIGHT: 255 LBS | DIASTOLIC BLOOD PRESSURE: 85 MMHG | HEIGHT: 72 IN | TEMPERATURE: 98.3 F

## 2023-09-15 DIAGNOSIS — I15.8 OTHER SECONDARY HYPERTENSION: Primary | ICD-10-CM

## 2023-09-15 DIAGNOSIS — I15.8 OTHER SECONDARY HYPERTENSION: ICD-10-CM

## 2023-09-15 LAB
ANION GAP SERPL CALCULATED.3IONS-SCNC: 11 MMOL/L (ref 5–15)
BUN SERPL-MCNC: 17 MG/DL (ref 6–20)
BUN/CREAT SERPL: 21.3 (ref 7–25)
CALCIUM SPEC-SCNC: 9.2 MG/DL (ref 8.6–10.5)
CHLORIDE SERPL-SCNC: 104 MMOL/L (ref 98–107)
CHOLEST SERPL-MCNC: 207 MG/DL (ref 0–200)
CO2 SERPL-SCNC: 26 MMOL/L (ref 22–29)
CREAT SERPL-MCNC: 0.8 MG/DL (ref 0.76–1.27)
EGFRCR SERPLBLD CKD-EPI 2021: 106.5 ML/MIN/1.73
GLUCOSE SERPL-MCNC: 86 MG/DL (ref 65–99)
HDLC SERPL-MCNC: 44 MG/DL (ref 40–60)
LDLC SERPL CALC-MCNC: 126 MG/DL (ref 0–100)
LDLC/HDLC SERPL: 2.75 {RATIO}
POTASSIUM SERPL-SCNC: 3.7 MMOL/L (ref 3.5–5.2)
SODIUM SERPL-SCNC: 141 MMOL/L (ref 136–145)
TRIGL SERPL-MCNC: 209 MG/DL (ref 0–150)
VLDLC SERPL-MCNC: 37 MG/DL (ref 5–40)

## 2023-09-15 PROCEDURE — 99213 OFFICE O/P EST LOW 20 MIN: CPT | Performed by: NURSE PRACTITIONER

## 2023-09-15 PROCEDURE — 80048 BASIC METABOLIC PNL TOTAL CA: CPT

## 2023-09-15 PROCEDURE — 80061 LIPID PANEL: CPT

## 2023-09-15 PROCEDURE — 36415 COLL VENOUS BLD VENIPUNCTURE: CPT

## 2023-09-15 NOTE — PROGRESS NOTES
"Subjective        Oliver Leone is a 52 y.o. male.     Chief Complaint   Patient presents with    Hypertension     6 month f/u  BMI DUE       History of Present Illness  PATIENT IS HERE FOR MANAGEMENT OF HIS CHRONIC MEDICAL PROBLEMS: ALLERGIES, HYPERTENSION,     HYPERTENSION: TAKING AMLODIPINE 5 MG DAILY. HE IS CHECKING 122-140/84-76.  TAKING BABY ASPIRIN . OUT OF MEDS 2 WEEKS.     Allergies: flonase daily with po meds.       The following portions of the patient's history were reviewed and updated as appropriate: allergies, current medications, past family history, past medical history, past social history, past surgical history and problem list.      Current Outpatient Medications:     amLODIPine (NORVASC) 5 MG tablet, Take 1 tablet by mouth Daily., Disp: 90 tablet, Rfl: 1    aspirin (aspirin) 81 MG EC tablet, ASPIRIN 81 81 MG TBEC, Disp: , Rfl:     fluticasone (FLONASE) 50 MCG/ACT nasal spray, FLONASE 50 MCG/ACT NASAL SUSPENSION, Disp: , Rfl:     ibuprofen (ADVIL,MOTRIN) 600 MG tablet, Take 1 tablet by mouth Every 6 (Six) Hours As Needed for Mild Pain., Disp: 60 tablet, Rfl: 0    multivitamin with minerals tablet tablet, Take 1 tablet by mouth Daily., Disp: , Rfl:     No results found for this or any previous visit (from the past 4032 hour(s)).      Review of Systems    Objective     /85 (BP Location: Left arm, Patient Position: Sitting, Cuff Size: Large Adult)   Pulse 101   Temp 98.3 °F (36.8 °C) (Infrared)   Ht 182.9 cm (72\")   Wt 116 kg (255 lb)   SpO2 98%   BMI 34.58 kg/m²     Physical Exam  Vitals and nursing note reviewed.   Constitutional:       Appearance: He is obese.   HENT:      Right Ear: External ear normal.      Left Ear: External ear normal.      Nose: Nose normal.      Mouth/Throat:      Mouth: Mucous membranes are moist.   Eyes:      Pupils: Pupils are equal, round, and reactive to light.   Cardiovascular:      Rate and Rhythm: Normal rate and regular rhythm.      Pulses: Normal " pulses.      Heart sounds: Normal heart sounds.   Pulmonary:      Effort: Pulmonary effort is normal.      Breath sounds: Normal breath sounds.   Abdominal:      General: Bowel sounds are normal.      Palpations: Abdomen is soft.   Musculoskeletal:      Cervical back: Neck supple.   Skin:     General: Skin is warm.      Capillary Refill: Capillary refill takes less than 2 seconds.   Neurological:      General: No focal deficit present.      Mental Status: He is alert and oriented to person, place, and time.   Psychiatric:         Mood and Affect: Mood normal.         Behavior: Behavior normal.         Thought Content: Thought content normal.       Result Review :                Assessment & Plan    Diagnoses and all orders for this visit:    1. Other secondary hypertension (Primary)  -     Lipid Panel; Future  -     Basic Metabolic Panel; Future      Patient Instructions   Follow up on labs  Eat healthy exercise.   Kerri Infante  Get eye exam     Follow Up   Return in about 6 months (around 3/15/2024).    Patient was given instructions and counseling regarding his condition or for health maintenance advice. Please see specific information pulled into the AVS if appropriate.     Carey Miles, APRN    09/15/23

## 2023-09-18 RX ORDER — AMLODIPINE BESYLATE 5 MG/1
5 TABLET ORAL DAILY
Qty: 90 TABLET | Refills: 1 | Status: SHIPPED | OUTPATIENT
Start: 2023-09-18

## 2023-09-18 NOTE — TELEPHONE ENCOUNTER
Caller: Oliver Leone    Relationship: Self    Best call back number: [phone]    Requested Prescriptions:   Requested Prescriptions     Pending Prescriptions Disp Refills    amLODIPine (NORVASC) 5 MG tablet 90 tablet 1     Sig: Take 1 tablet by mouth Daily.        Pharmacy where request should be sent: [facility] - [place], [place] - [address]  [phone]  - [phone] FX     Last office visit with prescribing clinician: 9/15/2023   Last telemedicine visit with prescribing clinician: Visit date not found   Next office visit with prescribing clinician: 3/8/2024     Additional details provided by patient: OUT    Does the patient have less than a 3 day supply:  [x] Yes  [] No    Would you like a call back once the refill request has been completed: [] Yes [] No    If the office needs to give you a call back, can they leave a voicemail: [] Yes [] No    Isai Montgomery   09/18/23 15:55 EDT

## 2023-12-18 RX ORDER — AMLODIPINE BESYLATE 5 MG/1
5 TABLET ORAL DAILY
Qty: 90 TABLET | Refills: 1 | Status: SHIPPED | OUTPATIENT
Start: 2023-12-18

## 2024-02-27 RX ORDER — AMLODIPINE BESYLATE 5 MG/1
5 TABLET ORAL DAILY
Qty: 30 TABLET | Refills: 1 | Status: SHIPPED | OUTPATIENT
Start: 2024-02-27

## 2024-02-27 NOTE — TELEPHONE ENCOUNTER
Caller: Oliver Leone    Relationship: Self    Best call back number: [phone]    Requested Prescriptions:   Requested Prescriptions     Pending Prescriptions Disp Refills    amLODIPine (NORVASC) 5 MG tablet 90 tablet 1     Sig: Take 1 tablet by mouth Daily.        Pharmacy where request should be sent: [facility] [place], [place] - [address]  [phone]  - [phone] FX     Last office visit with prescribing clinician: 9/15/2023   Last telemedicine visit with prescribing clinician: Visit date not found   Next office visit with prescribing clinician: 3/29/2024     Additional details provided by patient: HAS A WEEK LEFT    Does the patient have less than a 3 day supply:  [] Yes  [x] No    Would you like a call back once the refill request has been completed: [] Yes [x] No    If the office needs to give you a call back, can they leave a voicemail: [] Yes [x] No    Buffy Shanks Rep   02/27/24 08:15 EST

## 2024-03-29 NOTE — TELEPHONE ENCOUNTER
I called the patient to reschedule his appt due to you being out today. Patient states he will have to call back to r/s after he gets to work and checks his schedule. In the mean time, he needs another 30 day supply of Amlodipine 5mg sent in.

## 2024-03-30 RX ORDER — AMLODIPINE BESYLATE 5 MG/1
5 TABLET ORAL DAILY
Qty: 30 TABLET | Refills: 1 | Status: SHIPPED | OUTPATIENT
Start: 2024-03-30

## 2024-05-13 NOTE — TELEPHONE ENCOUNTER
Caller: Oliver Leone    Relationship: Self    Best call back number: [phone]    Requested Prescriptions:   Requested Prescriptions     Pending Prescriptions Disp Refills    amLODIPine (NORVASC) 5 MG tablet 30 tablet 1     Sig: Take 1 tablet by mouth Daily.        Pharmacy where request should be sent: [facility] - [place], [place] - [address]  [phone]  - [phone] FX     Last office visit with prescribing clinician: 9/15/2023   Last telemedicine visit with prescribing clinician: Visit date not found   Next office visit with prescribing clinician: Visit date not found       Does the patient have less than a 3 day supply:  [] Yes  [x] No        Buffy Pearl Rep   05/13/24 15:09 EDT

## 2024-05-14 RX ORDER — AMLODIPINE BESYLATE 5 MG/1
5 TABLET ORAL DAILY
Qty: 30 TABLET | Refills: 1 | OUTPATIENT
Start: 2024-05-14

## 2024-05-20 RX ORDER — AMLODIPINE BESYLATE 5 MG/1
5 TABLET ORAL DAILY
Qty: 30 TABLET | Refills: 1 | Status: SHIPPED | OUTPATIENT
Start: 2024-05-20

## 2024-05-20 NOTE — TELEPHONE ENCOUNTER
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        Pharmacy where request should be sent: [facility] - [facility] [address]  [phone] [facility] [phone]      Last office visit with prescribing clinician: 9/15/2023   Last telemedicine visit with prescribing clinician: Visit date not found   Next office visit with prescribing clinician: 7/26/2024     Additional details provided by patient:     Does the patient have less than a 3 day supply:  [x] Yes  [] No    Would you like a call back once the refill request has been completed: [] Yes [] No    If the office needs to give you a call back, can they leave a voicemail: [] Yes [] No    Buffy Ray Rep   05/20/24 15:56 EDT

## 2024-06-18 NOTE — TELEPHONE ENCOUNTER
Caller: Oliver Leone    Relationship: Self    Best call back number: [phone] (Mobile)     Requested Prescriptions:   Requested Prescriptions     Pending Prescriptions Disp Refills    amLODIPine (NORVASC) 5 MG tablet 30 tablet 1     Sig: Take 1 tablet by mouth Daily.        Pharmacy where request should be sent: [facility] - [place], [place] - [address] - [phone] [facility] [phone] FX     Last office visit with prescribing clinician: 9/15/2023   Last telemedicine visit with prescribing clinician: Visit date not found   Next office visit with prescribing clinician: 7/26/2024     Additional details provided by patient:     Does the patient have less than a 3 day supply:  [x] Yes  [] No    Would you like a call back once the refill request has been completed: [] Yes [] No    If the office needs to give you a call back, can they leave a voicemail: [] Yes [] No    Buffy Ray Rep   06/18/24 15:41 EDT

## 2024-06-19 RX ORDER — AMLODIPINE BESYLATE 5 MG/1
5 TABLET ORAL DAILY
Qty: 30 TABLET | Refills: 1 | Status: SHIPPED | OUTPATIENT
Start: 2024-06-19

## 2024-07-26 ENCOUNTER — TELEPHONE (OUTPATIENT)
Dept: FAMILY MEDICINE CLINIC | Facility: CLINIC | Age: 54
End: 2024-07-26

## 2024-07-26 DIAGNOSIS — I15.8 OTHER SECONDARY HYPERTENSION: Primary | ICD-10-CM

## 2024-07-26 RX ORDER — AMLODIPINE BESYLATE 5 MG/1
5 TABLET ORAL DAILY
Qty: 30 TABLET | Refills: 0 | Status: SHIPPED | OUTPATIENT
Start: 2024-07-26

## 2024-07-26 NOTE — TELEPHONE ENCOUNTER
Caller: Oliver Leone    Relationship: Self    Best call back number: [phone]    What medication are you requesting: amLODIPine (NORVASC) 5 MG tablet       If a prescription is needed, what is your preferred pharmacy and phone number: [facility] - [place] - [address] - [phone] [facility] [phone] FX     Additional notes: PATIENT CALLED TO FOLLOW UP WITH HIS APPOINTMENT FOR TODAY BUT IT HAD BEEN CANCELED BY OFF OFFICE. HUB RESCHEDULED APPOINTMENT     PT  STATES HE HAS BEEN OUT OF THIS MEDICATION FOR A WEEK AND REQUESTING THIS REFILL TODAY.    amLODIPine (NORVASC) 5 MG tablet     PATIENT WOULD LIKE TO BE CALLED  WHEN COMPLETED

## 2024-08-02 ENCOUNTER — OFFICE VISIT (OUTPATIENT)
Dept: FAMILY MEDICINE CLINIC | Facility: CLINIC | Age: 54
End: 2024-08-02
Payer: COMMERCIAL

## 2024-08-02 VITALS
OXYGEN SATURATION: 96 % | TEMPERATURE: 97.7 F | BODY MASS INDEX: 35.35 KG/M2 | WEIGHT: 261 LBS | DIASTOLIC BLOOD PRESSURE: 88 MMHG | HEIGHT: 72 IN | SYSTOLIC BLOOD PRESSURE: 138 MMHG | HEART RATE: 110 BPM | RESPIRATION RATE: 17 BRPM

## 2024-08-02 DIAGNOSIS — G47.09 OTHER INSOMNIA: ICD-10-CM

## 2024-08-02 DIAGNOSIS — E66.01 SEVERE OBESITY WITH BODY MASS INDEX (BMI) OF 35.0 TO 39.9 WITH SERIOUS COMORBIDITY: ICD-10-CM

## 2024-08-02 DIAGNOSIS — M25.512 ACUTE PAIN OF LEFT SHOULDER: Primary | ICD-10-CM

## 2024-08-02 DIAGNOSIS — I15.8 OTHER SECONDARY HYPERTENSION: ICD-10-CM

## 2024-08-02 DIAGNOSIS — M25.562 LEFT KNEE PAIN, UNSPECIFIED CHRONICITY: ICD-10-CM

## 2024-08-02 RX ORDER — AMLODIPINE BESYLATE 10 MG/1
10 TABLET ORAL DAILY
Qty: 90 TABLET | Refills: 0 | Status: SHIPPED | OUTPATIENT
Start: 2024-08-02

## 2024-08-02 NOTE — PROGRESS NOTES
Subjective        Oliver Leone is a 53 y.o. male.     Chief Complaint   Patient presents with    Hypertension    Insomnia    Arthritis    Shoulder Pain    Results     Wants to go over colonoscopy results       History of Present Illness  Patient is here for management of his chronic medical problems:   Hypertension,    Hypertension: taking amlodipine 5 mg he has been very stressed with family living with brother.   Has been noticing heart rate is running higher.     Colon cancer screening he completed his colonoscopy.     Chronic left shoulder pain started severe pain at night if lays is 7 , then can't sleep. He was on incline bench 285 lbs he felt electricity down the left     Left knee pain into his calf happened after basketball game.   He declines going to Physical therapy. He doing his own rehab at home.     Insomnia wants know what to do to help him sleep.   Discussed related to anxiety . He is very anxious about brother with special needs living with him.    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           The following portions of the patient's history were reviewed and updated as appropriate: allergies, current medications, past family history, past medical history, past social history, past surgical history and problem list.      Current Outpatient Medications:     amLODIPine (NORVASC) 5 MG tablet, Take 1 tablet by mouth Daily. Must be seen in office for additional refills, Disp: 30 tablet, Rfl: 0    aspirin (aspirin) 81 MG EC tablet, ASPIRIN 81 81 MG TBEC, Disp: , Rfl:     multivitamin with minerals tablet tablet, Take 1 tablet by mouth Daily., Disp: , Rfl:     fluticasone (FLONASE) 50 MCG/ACT nasal spray, FLONASE 50 MCG/ACT NASAL SUSPENSION (Patient not taking: Reported on 8/2/2024), Disp: , Rfl:     ibuprofen (ADVIL,MOTRIN) 600 MG tablet, Take 1 tablet by mouth Every 6 (Six) Hours As Needed for Mild Pain. (Patient not taking: Reported on 8/2/2024), Disp: 60 tablet, Rfl: 0    No  "results found for this or any previous visit (from the past 4032 hour(s)).      Review of Systems    Objective     /92 (BP Location: Left arm, Patient Position: Sitting, Cuff Size: Adult)   Pulse 110   Temp 97.7 °F (36.5 °C) (Oral)   Resp 17   Ht 182.9 cm (72\")   Wt 118 kg (261 lb)   SpO2 96%   BMI 35.40 kg/m²     Physical Exam  Vitals and nursing note reviewed.   Constitutional:       Appearance: Normal appearance.   HENT:      Head: Normocephalic.      Right Ear: Tympanic membrane normal.      Left Ear: Tympanic membrane normal.      Nose: Nose normal.      Mouth/Throat:      Mouth: Mucous membranes are moist.   Eyes:      Pupils: Pupils are equal, round, and reactive to light.   Cardiovascular:      Rate and Rhythm: Normal rate.      Pulses: Normal pulses.      Heart sounds: Normal heart sounds.   Pulmonary:      Effort: Pulmonary effort is normal.      Breath sounds: Normal breath sounds.   Abdominal:      General: Bowel sounds are normal.      Palpations: Abdomen is soft.   Musculoskeletal:      Left shoulder: Crepitus present. No swelling or deformity.        Arms:       Left knee: No swelling, effusion or erythema. Normal range of motion. Tenderness present.        Legs:    Skin:     General: Skin is warm.   Neurological:      General: No focal deficit present.      Mental Status: He is alert.   Psychiatric:         Mood and Affect: Mood normal. Mood is anxious.         Thought Content: Thought content normal.         Result Review :                Assessment & Plan    There are no diagnoses linked to this encounter.  There are no Patient Instructions on file for this visit.    Follow Up   No follow-ups on file.    Patient was given instructions and counseling regarding his condition or for health maintenance advice. Please see specific information pulled into the AVS if appropriate.     Carey Miles, APRN    08/02/24      Answers submitted by the patient for this visit:  Primary Reason for " Visit (Submitted on 7/31/2024)  What is the primary reason for your visit?: Other  Other (Submitted on 7/31/2024)  Please describe your symptoms.: Old, tired, grumpy, and cant sleep worth a darn.  Have you had these symptoms before?: Yes  How long have you been having these symptoms?: Greater than 2 weeks  Please list any medications you are currently taking for this condition.: Blood pressure medicine 5mg daily. A multivitamin, an allergy pill ( rotating brands), and a baby aspirin 81mg.  Please describe any probable cause for these symptoms. : N/A

## 2024-08-02 NOTE — ASSESSMENT & PLAN NOTE
Patient's (Body mass index is 35.4 kg/m².) indicates that they are obese (BMI >30) with health conditions that include hypertension . Weight is  patient is .  . BMI  is above average; BMI management plan is completed. We discussed portion control and increasing exercise.

## 2024-09-06 ENCOUNTER — TELEPHONE (OUTPATIENT)
Dept: FAMILY MEDICINE CLINIC | Facility: CLINIC | Age: 54
End: 2024-09-06
Payer: COMMERCIAL

## 2024-09-06 NOTE — TELEPHONE ENCOUNTER
Called patient to follow up on labs.       Hub ok to relay:    Has he gotten the labs that Carey order yet?

## 2024-09-06 NOTE — TELEPHONE ENCOUNTER
Name: Anitracricket Oliver EAGLE      Relationship: Self      Best Callback Number: [phone]      HUB PROVIDED THE RELAY MESSAGE FROM THE OFFICE      PATIENT: VOICED UNDERSTANDING AND HAS NO FURTHER QUESTIONS AT THIS TIME    ADDITIONAL INFORMATION: PATIENT GOING TO HAVE COMPLETED SOON.

## 2024-10-03 ENCOUNTER — TELEPHONE (OUTPATIENT)
Dept: FAMILY MEDICINE CLINIC | Facility: CLINIC | Age: 54
End: 2024-10-03
Payer: COMMERCIAL

## 2024-10-25 ENCOUNTER — LAB (OUTPATIENT)
Dept: LAB | Facility: HOSPITAL | Age: 54
End: 2024-10-25
Payer: COMMERCIAL

## 2024-10-25 DIAGNOSIS — I15.8 OTHER SECONDARY HYPERTENSION: ICD-10-CM

## 2024-10-25 LAB
ALBUMIN SERPL-MCNC: 4.4 G/DL (ref 3.5–5.2)
ALBUMIN/GLOB SERPL: 1.4 G/DL
ALP SERPL-CCNC: 58 U/L (ref 39–117)
ALT SERPL W P-5'-P-CCNC: 31 U/L (ref 1–41)
ANION GAP SERPL CALCULATED.3IONS-SCNC: 11 MMOL/L (ref 5–15)
AST SERPL-CCNC: 20 U/L (ref 1–40)
BILIRUB SERPL-MCNC: 0.3 MG/DL (ref 0–1.2)
BUN SERPL-MCNC: 16 MG/DL (ref 6–20)
BUN/CREAT SERPL: 17.6 (ref 7–25)
CALCIUM SPEC-SCNC: 9.3 MG/DL (ref 8.6–10.5)
CHLORIDE SERPL-SCNC: 103 MMOL/L (ref 98–107)
CHOLEST SERPL-MCNC: 194 MG/DL (ref 0–200)
CO2 SERPL-SCNC: 27 MMOL/L (ref 22–29)
CREAT SERPL-MCNC: 0.91 MG/DL (ref 0.76–1.27)
EGFRCR SERPLBLD CKD-EPI 2021: 100.8 ML/MIN/1.73
GLOBULIN UR ELPH-MCNC: 3.1 GM/DL
GLUCOSE SERPL-MCNC: 87 MG/DL (ref 65–99)
HDLC SERPL-MCNC: 42 MG/DL (ref 40–60)
LDLC SERPL CALC-MCNC: 112 MG/DL (ref 0–100)
LDLC/HDLC SERPL: 2.53 {RATIO}
POTASSIUM SERPL-SCNC: 3.7 MMOL/L (ref 3.5–5.2)
PROT SERPL-MCNC: 7.5 G/DL (ref 6–8.5)
SODIUM SERPL-SCNC: 141 MMOL/L (ref 136–145)
TRIGL SERPL-MCNC: 228 MG/DL (ref 0–150)
TSH SERPL DL<=0.05 MIU/L-ACNC: 2.99 UIU/ML (ref 0.27–4.2)
VLDLC SERPL-MCNC: 40 MG/DL (ref 5–40)

## 2024-10-25 PROCEDURE — 80061 LIPID PANEL: CPT

## 2024-10-25 PROCEDURE — 80053 COMPREHEN METABOLIC PANEL: CPT

## 2024-10-25 PROCEDURE — 84443 ASSAY THYROID STIM HORMONE: CPT

## 2024-10-25 PROCEDURE — 36415 COLL VENOUS BLD VENIPUNCTURE: CPT

## 2024-11-04 DIAGNOSIS — I15.8 OTHER SECONDARY HYPERTENSION: ICD-10-CM

## 2024-11-04 RX ORDER — AMLODIPINE BESYLATE 10 MG/1
10 TABLET ORAL DAILY
Qty: 90 TABLET | Refills: 0 | Status: SHIPPED | OUTPATIENT
Start: 2024-11-04

## 2024-12-09 DIAGNOSIS — I15.8 OTHER SECONDARY HYPERTENSION: ICD-10-CM

## 2024-12-09 RX ORDER — AMLODIPINE BESYLATE 10 MG/1
10 TABLET ORAL DAILY
Qty: 90 TABLET | Refills: 0 | Status: SHIPPED | OUTPATIENT
Start: 2024-12-09

## 2024-12-09 NOTE — TELEPHONE ENCOUNTER
Caller: Oliver Leone    Relationship: Self    Best call back number: [phone]    Requested Prescriptions:   Requested Prescriptions     Pending Prescriptions Disp Refills    amLODIPine (NORVASC) 10 MG tablet 90 tablet 0     Sig: Take 1 tablet by mouth Daily. Must be seen in office for additional refills      Pharmacy where request should be sent: [facility] - [facility] [address] .  [phone] [facility] [phone]      Last office visit with prescribing clinician: 8/2/2024   Last telemedicine visit with prescribing clinician: Visit date not found   Next office visit with prescribing clinician: 2/7/2025     Additional details provided by patient: PT HAS 2 DAYS LEFT OF MEDICATION. WANTS THIS CHANGED TO 3 MONTH SUPPLY IF POSSIBLE.     Does the patient have less than a 3 day supply:  [x] Yes  [] No    Would you like a call back once the refill request has been completed: [] Yes [x] No    Buffy Yates Rep   12/09/24 15:21 EST

## 2024-12-16 ENCOUNTER — TELEPHONE (OUTPATIENT)
Dept: FAMILY MEDICINE CLINIC | Facility: CLINIC | Age: 54
End: 2024-12-16
Payer: COMMERCIAL

## 2024-12-16 DIAGNOSIS — G89.29 CHRONIC PAIN OF LEFT KNEE: Primary | ICD-10-CM

## 2024-12-16 DIAGNOSIS — M25.562 CHRONIC PAIN OF LEFT KNEE: Primary | ICD-10-CM

## 2024-12-16 NOTE — TELEPHONE ENCOUNTER
Caller: Oliver Leone    Relationship: Self    Best call back number: [phone]     What is the medical concern/diagnosis: KNEE PAIN     What specialty or service is being requested: ORTHOPEDIC    What is the provider, practice or medical service name:     What is the office location:      What is the office phone number:      Any additional details: PATIENT STATED THAT HISHURT HIS KNEE PLAYING BASKETBALL.

## 2025-01-14 ENCOUNTER — TELEPHONE (OUTPATIENT)
Dept: ORTHOPEDIC SURGERY | Facility: CLINIC | Age: 55
End: 2025-01-14
Payer: COMMERCIAL

## 2025-01-14 NOTE — TELEPHONE ENCOUNTER
CALLED PATIENT IN ATTEMPTING TO MOVE UP ON FROM WAIT LIST. WAS GOING TO OFFER A GPalON APPT FOR SOONER AVAILABILITY. NO VOICEMAIL LEFT

## 2025-01-27 ENCOUNTER — TELEPHONE (OUTPATIENT)
Age: 55
End: 2025-01-27
Payer: COMMERCIAL

## 2025-02-07 ENCOUNTER — OFFICE VISIT (OUTPATIENT)
Dept: FAMILY MEDICINE CLINIC | Facility: CLINIC | Age: 55
End: 2025-02-07
Payer: COMMERCIAL

## 2025-02-07 VITALS
BODY MASS INDEX: 35.43 KG/M2 | SYSTOLIC BLOOD PRESSURE: 127 MMHG | HEART RATE: 94 BPM | RESPIRATION RATE: 18 BRPM | HEIGHT: 72 IN | DIASTOLIC BLOOD PRESSURE: 81 MMHG | WEIGHT: 261.6 LBS | OXYGEN SATURATION: 95 % | TEMPERATURE: 98.3 F

## 2025-02-07 DIAGNOSIS — B07.9 WART OF HAND: Chronic | ICD-10-CM

## 2025-02-07 DIAGNOSIS — I15.8 OTHER SECONDARY HYPERTENSION: Primary | Chronic | ICD-10-CM

## 2025-02-07 DIAGNOSIS — R25.1 OCCASIONAL TREMORS: ICD-10-CM

## 2025-02-07 PROCEDURE — 99214 OFFICE O/P EST MOD 30 MIN: CPT | Performed by: NURSE PRACTITIONER

## 2025-02-07 RX ORDER — AMLODIPINE BESYLATE 10 MG/1
10 TABLET ORAL DAILY
Qty: 90 TABLET | Refills: 1 | Status: SHIPPED | OUTPATIENT
Start: 2025-02-07

## 2025-02-07 NOTE — ASSESSMENT & PLAN NOTE
Just started noticing when he holds hands out they shake.   Mother and grandmother both have tremors hands and head .

## 2025-02-07 NOTE — PROGRESS NOTES
Subjective        Oliver Leone is a 54 y.o. male.     Chief Complaint   Patient presents with    Hypertension     6 month fl/u    Shaking     Hand shaking    GI Problem       Hypertension    Shaking  GI Problem      Patient is here for management of his chronic medical problems hypertension, noticed hands shaking , has wart on his hand,     Hands shaking sometimes  concerned because his mom and grandma had this.   He said he will drop light things but he can turn wrenches, he can do purposeful tasks .   He has no family history of parkinson's. He has been  in past.     Hypertension taking amlodipine 10 mg daily  denies any swelling to hands or feet.   He is checking few times a week and said is never high.     Noticed wart on his right hand 4th finger. Wanted to discuss options. Has had it for awhile.   Not used anything on this.             The following portions of the patient's history were reviewed and updated as appropriate: allergies, current medications, past family history, past medical history, past social history, past surgical history and problem list.      Current Outpatient Medications:     amLODIPine (NORVASC) 10 MG tablet, Take 1 tablet by mouth Daily., Disp: 90 tablet, Rfl: 1    aspirin (aspirin) 81 MG EC tablet, ASPIRIN 81 81 MG TBEC, Disp: , Rfl:     multivitamin with minerals tablet tablet, Take 1 tablet by mouth Daily., Disp: , Rfl:     Recent Results (from the past 24 weeks)   Lipid Panel    Collection Time: 10/25/24  4:18 PM    Specimen: Blood   Result Value Ref Range    Total Cholesterol 194 0 - 200 mg/dL    Triglycerides 228 (H) 0 - 150 mg/dL    HDL Cholesterol 42 40 - 60 mg/dL    LDL Cholesterol  112 (H) 0 - 100 mg/dL    VLDL Cholesterol 40 5 - 40 mg/dL    LDL/HDL Ratio 2.53    Comprehensive Metabolic Panel    Collection Time: 10/25/24  4:18 PM    Specimen: Blood   Result Value Ref Range    Glucose 87 65 - 99 mg/dL    BUN 16 6 - 20 mg/dL    Creatinine 0.91 0.76 - 1.27 mg/dL     "Sodium 141 136 - 145 mmol/L    Potassium 3.7 3.5 - 5.2 mmol/L    Chloride 103 98 - 107 mmol/L    CO2 27.0 22.0 - 29.0 mmol/L    Calcium 9.3 8.6 - 10.5 mg/dL    Total Protein 7.5 6.0 - 8.5 g/dL    Albumin 4.4 3.5 - 5.2 g/dL    ALT (SGPT) 31 1 - 41 U/L    AST (SGOT) 20 1 - 40 U/L    Alkaline Phosphatase 58 39 - 117 U/L    Total Bilirubin 0.3 0.0 - 1.2 mg/dL    Globulin 3.1 gm/dL    A/G Ratio 1.4 g/dL    BUN/Creatinine Ratio 17.6 7.0 - 25.0    Anion Gap 11.0 5.0 - 15.0 mmol/L    eGFR 100.8 >60.0 mL/min/1.73   TSH Rfx On Abnormal To Free T4    Collection Time: 10/25/24  4:18 PM    Specimen: Blood   Result Value Ref Range    TSH 2.990 0.270 - 4.200 uIU/mL         Review of Systems    Objective     /81 (BP Location: Left arm, Patient Position: Sitting, Cuff Size: Adult)   Pulse 94   Temp 98.3 °F (36.8 °C) (Oral)   Resp 18   Ht 182.9 cm (72.01\")   Wt 119 kg (261 lb 9.6 oz)   SpO2 95%   BMI 35.47 kg/m²     Physical Exam  Vitals and nursing note reviewed.   Constitutional:       Appearance: Normal appearance.   HENT:      Head: Normocephalic.      Right Ear: Tympanic membrane normal.      Left Ear: Tympanic membrane normal.      Nose: Nose normal.      Mouth/Throat:      Mouth: Mucous membranes are moist.   Eyes:      Pupils: Pupils are equal, round, and reactive to light.   Cardiovascular:      Rate and Rhythm: Normal rate and regular rhythm.      Pulses: Normal pulses.      Heart sounds: Normal heart sounds.   Pulmonary:      Effort: Pulmonary effort is normal.   Abdominal:      Palpations: Abdomen is soft.   Musculoskeletal:         General: Normal range of motion.   Skin:     General: Skin is warm and dry.      Comments: 4th finger right hand wart small.      Neurological:      General: No focal deficit present.      Mental Status: He is alert and oriented to person, place, and time.      Motor: Tremor present. No weakness.      Comments: Both hands when holding arms out. No head lino noted.    emil and " equal strenghts emil and equal.    Psychiatric:         Mood and Affect: Mood normal.         Thought Content: Thought content normal.         Result Review :                Assessment & Plan    Diagnoses and all orders for this visit:    1. Other secondary hypertension (Primary)  Comments:  stable on current medications.  Orders:  -     amLODIPine (NORVASC) 10 MG tablet; Take 1 tablet by mouth Daily.  Dispense: 90 tablet; Refill: 1    2. Occasional tremors  Comments:  he wants monitor for now can refer him to neuorlogy .  Assessment & Plan:  Just started noticing when he holds hands out they shake.   Mother and grandmother both have tremors hands and head .         3. Wart of hand  Comments:  He will try the electrical tape for one month see if that removes the wart.      Patient Instructions   Follow up 6 months.      Follow Up   Return in about 6 months (around 8/7/2025).    Patient was given instructions and counseling regarding his condition or for health maintenance advice. Please see specific information pulled into the AVS if appropriate.     Carey Miles, CALLIE    02/07/25

## 2025-02-14 ENCOUNTER — OFFICE VISIT (OUTPATIENT)
Dept: ORTHOPEDIC SURGERY | Facility: CLINIC | Age: 55
End: 2025-02-14
Payer: COMMERCIAL

## 2025-02-14 VITALS — HEIGHT: 72 IN | BODY MASS INDEX: 35.35 KG/M2 | WEIGHT: 261 LBS | OXYGEN SATURATION: 97 % | RESPIRATION RATE: 20 BRPM

## 2025-02-14 DIAGNOSIS — M23.50 CHRONIC INSTABILITY OF KNEE, UNSPECIFIED LATERALITY: ICD-10-CM

## 2025-02-14 DIAGNOSIS — M25.562 CHRONIC PAIN OF BOTH KNEES: Primary | ICD-10-CM

## 2025-02-14 DIAGNOSIS — M25.561 CHRONIC PAIN OF BOTH KNEES: Primary | ICD-10-CM

## 2025-02-14 DIAGNOSIS — M25.462 EFFUSION OF LEFT KNEE: ICD-10-CM

## 2025-02-14 DIAGNOSIS — M25.461 EFFUSION OF RIGHT KNEE: ICD-10-CM

## 2025-02-14 DIAGNOSIS — G89.29 CHRONIC PAIN OF BOTH KNEES: Primary | ICD-10-CM

## 2025-02-14 NOTE — PROGRESS NOTES
"[facility] [place] [facility]        Patient Name: Oliver Leone  : 1970  Primary Care Physician: Carey Miles APRN        Chief Complaint:    Chief Complaint   Patient presents with    Left Knee - Pain, Initial Evaluation     Pain with stair or getting in and out of his truck        Right Knee - Pain, Initial Evaluation     Right knee feel unstable    Pt used to play pickup basketball and  time last year he felt a \"popping\" sound             HPI:   History of Present Illness  The patient is a 54-year-old male who presents for evaluation of bilateral knee pain.    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 He experienced a fall from a roof approximately several years ago,  landing on one leg and resulting in a torn ACL and MCL in his right knee.     Despite undergoing surgical repair, he did not complete the recommended rehabilitation and has since experienced persistent weakness in his right quadriceps. This weakness is particularly noticeable when ascending or descending ladders, causing his quadriceps to tremble and his calves to tighten as they compensate for stabilization.     During a basketball game around 2024, he twisted his right knee while attempting a rebound, resulting in a popping sensation. He reports that this sensation was different from the previous ACL tear. He is unable to squat and has ceased weightlifting due to his inability to perform leg exercises.     He experiences constant swelling in his knees, which worsens with activity. Reports chronic instability as well.  He has a history of claustrophobia and has refused MRIs twice in the past.           Past Medical/Surgical, Social and Family History:  I have reviewed and/or updated pertinent history as noted in the medical record including:  Past Medical History:   Diagnosis Date    Ankle sprain     Sports    Arthritis of neck  "    Sports injury    Fracture, femur 1989    Skiing    Fracture, finger 1987    Sports injury    Headache 2012    HL (hearing loss) 2012    Hypertension     Knee sprain 2001    Work    Knee swelling 2002    Career caused    Low back strain 1997    Work related    Neck strain 2001    Sports injury    Tear of meniscus of knee 2007    Basketball     Past Surgical History:   Procedure Laterality Date    KNEE SURGERY      R side    NECK SURGERY  2001    Sports    TONSILLECTOMY       Social History     Occupational History    Not on file   Tobacco Use    Smoking status: Former     Current packs/day: 0.00     Average packs/day: 0.3 packs/day for 15.0 years (3.8 ttl pk-yrs)     Types: Cigars, Cigarettes     Start date: 4/15/2006     Quit date: 4/15/2021     Years since quitting: 3.8     Passive exposure: Past    Smokeless tobacco: Current     Types: Snuff    Tobacco comments:     pt rarely smokes cigars   Vaping Use    Vaping status: Never Used   Substance and Sexual Activity    Alcohol use: Never    Drug use: Never    Sexual activity: Yes     Partners: Female     Birth control/protection: Condom          Allergies:   Allergies   Allergen Reactions    Codeine Anaphylaxis    Hydrocodone Shortness Of Breath    Oxycodone Shortness Of Breath       Medications:   Home Medications:  Current Outpatient Medications on File Prior to Visit   Medication Sig    amLODIPine (NORVASC) 10 MG tablet Take 1 tablet by mouth Daily.    aspirin (aspirin) 81 MG EC tablet ASPIRIN 81 81 MG TBEC    multivitamin with minerals tablet tablet Take 1 tablet by mouth Daily.     No current facility-administered medications on file prior to visit.         ROS:  Negative unless listed in the HPI    Physical Exam:   54 y.o. male  Body mass index is 35.4 kg/m²., 118 kg (261 lb)  Vitals:    02/14/25 1242   Resp: 20   SpO2: 97%     General: Alert, cooperative, appears well and in no observable distress.   HEENT: Normocephalic, atraumatic on external visual  inspection. No icterus.   CV: No significant peripheral edema.    Respiratory: Normal respiratory effort.   Skin: Warm & well perfused; appropriate skin turgor.  Psych: Appropriate mood & affect.  Neuro: Gross sensation and motor intact in affected extremity/extremities.  Vascular: Peripheral pulses palpable in affected extremity/extremities.     Ortho Exam   Knee Musculoskeletal Exam  Gait    Antalgic: right    Inspection    Right      Erythema: none        Effusion: mild        Edema: none        Ecchymosis: none        Deformity: none      Left      Erythema: none        Effusion: mild        Edema: none        Ecchymosis: none        Deformity: none      Range of Motion    Right      Active extension: 0      Active flexion: 90    Left      Active extension: 0      Active flexion: 90    Strength    Right      Extension: 3/5. Extension is affected by pain.       Flexion: 3/5. Flexion is affected by pain.     Left      Extension: 4/5. Extension is affected by pain.       Flexion: 4/5. Flexion is affected by pain.      Instability    Right      Varus stress grade: 1+      Valgus stress grade: 1+    Left      Varus stress grade: 1+      Valgus stress grade: 1+      Radiology:  Results  Imaging  Mild degenerative changes noted bilaterally       Assessment:  Assessment & Plan  1. Bilateral knee pain, effusion and instability   The patient reports chronic instability, weakness, and swelling in both knees, with a history of ACL and meniscus tears in the right knee. He experienced an audible pop in the right knee during a recent basketball game. X-rays show no significant arthritis. An MRI of both knees will be ordered to assess for potential ligament tears or cartilage damage. A Drytex brace will be provided for stabilization, to be worn at all times except during sleep.         Body mass index is 35.4 kg/m².  BMI consistent with Obese Class II: 35-39.9kg/m2         Patient encouraged to call with any questions or  concerns in the interim    CALLIE Strong     Patient or patient representative verbalized consent for the use of Ambient Listening during the visit with  CALLIE Strong for chart documentation. 2/14/2025  14:05 EST

## 2025-02-27 ENCOUNTER — DOCUMENTATION (OUTPATIENT)
Dept: ORTHOPEDIC SURGERY | Facility: CLINIC | Age: 55
End: 2025-02-27
Payer: COMMERCIAL

## 2025-02-27 NOTE — PROGRESS NOTES
Called patient and reviewed recent MRI results  Recommend evaluation with Dr. Hart for the left knee  Appt scheduled    MRI KNEE WITHOUT CONTRAST RIGHT, MRI KNEE WITHOUT CONTRAST LEFT    Date of Exam: 2/25/2025 16:11 EST    Indication: Chronic knee pain.    Comparison: Bilateral knee x-rays 2/14/2025    Technique: Routine multiplanar/multisequence images of the right and left knee were obtained without contrast administration.    RIGHT KNEE:  Findings:  There is evidence of prior ACL reconstruction. Unremarkable appearance of the femoral and tibial tunnels. The ACL graft is intact. The posterior cruciate ligament is normal.    There is a longitudinal horizontal tear of the posterior horn of the medial meniscus which extends to the tibial surface (series 7 image 19). There is chondromalacia and regions of moderate-grade chondral fissuring and chondral irregularity in the central weightbearing portion of the medial compartment mostly along the medial femoral condyle. The medial supporting structures are normal.    There is some increased central signal within the body and posterior horn of the lateral meniscus which appears to extend to the tibial surface along the posterior horn, favor longitudinal horizontal tear although a component of intrasubstance degeneration or prior partial meniscectomy could have a similar appearance. There is chondromalacia and regions of moderate to high-grade chondral loss and chondral irregularity along the posterior central weightbearing portion of the lateral femoral condyle (series 7 image 5-7). The lateral supporting structures are normal.    The quadriceps tendon and patellar tendon are normal. There is chondromalacia and high-grade chondral fissuring and chondral irregularity along the central portion of the lateral patellar facet (series 3 image 8, series 7 image 9). There is chondromalacia and areas of moderate-grade chondral irregularity of the trochlear cartilage.    There is  a trace knee joint effusion. There is trace fluid in a popliteal cyst. The posterior knee neurovasculature is unremarkable. Normal appearance of the muscles and subcutaneous tissues.    No focal bony lesion. No fracture or bony contusion.    Impression:  Evidence of prior ACL reconstruction. The ACL graft is intact.    Longitudinal horizontal tear of the posterior horn of the medial meniscus. Suspected longitudinal horizontal tear of the lateral meniscus.    Chondromalacia and regions of moderate to high-grade or full-thickness chondral fissuring and chondral irregularity in the medial, lateral, and patellofemoral compartments, detailed above.    Trace knee joint effusion.          LEFT KNEE:  Findings:  Normal appearance of the medial meniscus. No high-grade chondral loss in the medial compartment. The medial supporting structures are normal.    There is some irregularity at the posterior horn of the lateral meniscus (series 4 image 16, series 7 image 17), suspicious for mildly displaced undersurface flap tear or perhaps parrot-beak tear. Otherwise the lateral meniscus appears unremarkable. There is a small region of high-grade or full-thickness chondral loss along the posterior central weightbearing aspect of the lateral femoral condyle (series 7 image 20). There is some chondral thinning of the lateral tibial plateau. The lateral supporting structures are normal. The anterior cruciate ligament and posterior cruciate ligament are normal.    There is some tendinosis of the distal quadriceps tendon. There is some edema of the suprapatellar fat pad. There is chondromalacia and low to moderate-grade fissuring of the patellar articular cartilage mostly along the central portion of the lateral facet. The trochlear cartilage appears relatively well-preserved.    There is a physiologic amount of knee joint fluid. There is trace fluid in a popliteal cyst. The posterior knee neurovasculature is unremarkable. Normal  appearance of the muscles and subcutaneous tissues.    No focal bony lesion. No fracture or bony contusion.        Impression:  Subtle irregularity at the posterior horn of the lateral meniscus suspicious for a mildly displaced undersurface flap tear or parrot-beak tear. There is a small area of high-grade or full-thickness chondral loss in the lateral compartment.    Chondromalacia and moderate-grade fissuring of the patellar articular cartilage.    Electronically Signed: Torrey Busch MD  2/27/2025 13:58 EST  Workstation ID: [id number]

## 2025-03-03 ENCOUNTER — TELEPHONE (OUTPATIENT)
Age: 55
End: 2025-03-03
Payer: COMMERCIAL

## 2025-03-03 NOTE — TELEPHONE ENCOUNTER
LVM LETTING PATIENT KNOW DR PETERS IS OUT OF OFFICE IN AFTERNOON 3/6. MOVED TO 3/10 AT 2:00 PM TO HOLD A SPOT BUT ALSO LET HIM KNOW WE COULD DO A MORNING ON 3/6 IF WANTED.

## 2025-03-13 ENCOUNTER — PREP FOR SURGERY (OUTPATIENT)
Dept: OTHER | Facility: HOSPITAL | Age: 55
End: 2025-03-13
Payer: COMMERCIAL

## 2025-03-13 ENCOUNTER — OFFICE VISIT (OUTPATIENT)
Dept: ORTHOPEDIC SURGERY | Facility: CLINIC | Age: 55
End: 2025-03-13
Payer: COMMERCIAL

## 2025-03-13 VITALS — HEIGHT: 72 IN | BODY MASS INDEX: 35.35 KG/M2 | HEART RATE: 110 BPM | WEIGHT: 261 LBS

## 2025-03-13 DIAGNOSIS — M23.303 DERANGEMENT OF MEDIAL MENISCUS OF RIGHT KNEE: Primary | ICD-10-CM

## 2025-03-13 DIAGNOSIS — M23.303 DERANGEMENT OF MEDIAL MENISCUS OF RIGHT KNEE: ICD-10-CM

## 2025-03-13 DIAGNOSIS — M17.11 PRIMARY LOCALIZED OSTEOARTHRITIS OF RIGHT KNEE: Primary | ICD-10-CM

## 2025-03-13 PROCEDURE — 99214 OFFICE O/P EST MOD 30 MIN: CPT | Performed by: ORTHOPAEDIC SURGERY

## 2025-03-13 NOTE — PROGRESS NOTES
Patient ID: Oliver Leone is a 54 y.o. male.    Chief Complaint:    Chief Complaint   Patient presents with    Right Knee - Initial Evaluation, Pain     Pain 6       HPI:  This is a 54-year-old gentleman here with longstanding right knee pain has had distant history of ACL reconstruction with allograft he has now developed pain over the medial joint line with swelling.  He also feels instability but that is helped with the brace.  Sometimes feels clicking and catching.  Worse with high impact sports like basketball.  Past Medical History:   Diagnosis Date    Ankle sprain 1987    Sports    Arthritis of neck 2001    Sports injury    Fracture, femur 1989    Skiing    Fracture, finger 1987    Sports injury    Headache 2012    HL (hearing loss) 2012    Hypertension     Knee sprain 2001    Work    Knee swelling 2002    Career caused    Low back strain 1997    Work related    Neck strain 2001    Sports injury    Tear of meniscus of knee 2007    Basketball       Past Surgical History:   Procedure Laterality Date    KNEE SURGERY      R side    NECK SURGERY  2001    Sports    TONSILLECTOMY         Family History   Problem Relation Age of Onset    Heart disease Mother     Diabetes Mother     Clotting disorder Mother     Diabetes Father     Heart disease Father     Alcohol abuse Father     COPD Maternal Grandmother     COPD Maternal Grandfather     Diabetes Sister     Diabetes Brother           Social History     Occupational History    Not on file   Tobacco Use    Smoking status: Former     Current packs/day: 0.00     Average packs/day: 0.3 packs/day for 15.0 years (3.8 ttl pk-yrs)     Types: Cigars, Cigarettes     Start date: 4/15/2006     Quit date: 4/15/2021     Years since quitting: 3.9     Passive exposure: Past    Smokeless tobacco: Current     Types: Snuff    Tobacco comments:     pt rarely smokes cigars   Vaping Use    Vaping status: Never Used   Substance and Sexual Activity    Alcohol use: Never    Drug use:  "Never    Sexual activity: Yes     Partners: Female     Birth control/protection: Condom      Review of Systems   Cardiovascular:  Negative for chest pain.       Objective:    Pulse 110   Ht 182.9 cm (72\")   Wt 118 kg (261 lb)   BMI 35.40 kg/m²     Physical Examination:  Right knee demonstrates healed arthroscopic incisions mild to moderate medial joint line tenderness mild knee effusion knee range of motion 0 to 125 degrees no varus valgus laxity negative Lachman anterior posterior drawers.  Mild pain on medial lateral Francie.  Sensory and motor exam are intact in all distributions. Dorsalis pedis and posterior tibialis pulses are palpable and capillary refill is less than two seconds to all digits.    Imaging:  X-ray demonstrates ACL tunnels in position with somewhat vertical alignment nonmetallic fixation present moderate tricompartmental degenerative disease MRI demonstrates ACL graft is healed with longitudinal tear of the medial meniscus and likely postop changes of the lateral meniscus mild to moderate tricompartmental degenerative disease    Assessment:  Right knee pain with degenerative joint disease and meniscus tear    Plan:  Further options were discussed he would like to proceed with right knee arthroscopy and partial medial meniscectomy likely followed up with postop viscosupplementation  Risks and benefits, specifically risks of bleeding, scar, infection, stiffness, retear, nerve, tendon, artery damage, need for further surgery, DVT, and loss of life or limb were discussed. Questions, rehab, and restrictions were answered and addressed.      Procedures         Disclaimer: Part of this note may be an electronic transcription/translation of spoken language to printed text using the Dragon Dictation System  "

## 2025-04-10 ENCOUNTER — TELEPHONE (OUTPATIENT)
Dept: ORTHOPEDIC SURGERY | Facility: CLINIC | Age: 55
End: 2025-04-10
Payer: COMMERCIAL

## 2025-04-10 NOTE — TELEPHONE ENCOUNTER
Patient called in inquiring about short term disability paperwork. I did not see it in the chart. He said the company faxed it to our office. I gave him [phone] fax and asked him to have them refax to us.

## 2025-04-11 ENCOUNTER — TELEPHONE (OUTPATIENT)
Dept: ORTHOPEDIC SURGERY | Facility: CLINIC | Age: 55
End: 2025-04-11
Payer: COMMERCIAL

## 2025-04-26 ENCOUNTER — HOSPITAL ENCOUNTER (OUTPATIENT)
Dept: CARDIOLOGY | Facility: HOSPITAL | Age: 55
Discharge: HOME | End: 2025-04-26
Payer: COMMERCIAL

## 2025-04-26 ENCOUNTER — LAB (OUTPATIENT)
Dept: LAB | Facility: HOSPITAL | Age: 55
End: 2025-04-26
Payer: COMMERCIAL

## 2025-04-26 DIAGNOSIS — M23.303 DERANGEMENT OF MEDIAL MENISCUS OF RIGHT KNEE: ICD-10-CM

## 2025-04-26 LAB
ANION GAP SERPL CALCULATED.3IONS-SCNC: 7.8 MMOL/L (ref 5–15)
BASOPHILS # BLD AUTO: 0.05 10*3/MM3 (ref 0–0.2)
BASOPHILS NFR BLD AUTO: 0.9 % (ref 0–1.5)
BUN SERPL-MCNC: 15 MG/DL (ref 6–20)
BUN/CREAT SERPL: 20.5 (ref 7–25)
CALCIUM SPEC-SCNC: 9.2 MG/DL (ref 8.6–10.5)
CHLORIDE SERPL-SCNC: 103 MMOL/L (ref 98–107)
CO2 SERPL-SCNC: 25.2 MMOL/L (ref 22–29)
CREAT SERPL-MCNC: 0.73 MG/DL (ref 0.76–1.27)
DEPRECATED RDW RBC AUTO: 42.7 FL (ref 37–54)
EGFRCR SERPLBLD CKD-EPI 2021: 108.1 ML/MIN/1.73
EOSINOPHIL # BLD AUTO: 0.3 10*3/MM3 (ref 0–0.4)
EOSINOPHIL NFR BLD AUTO: 5.2 % (ref 0.3–6.2)
ERYTHROCYTE [DISTWIDTH] IN BLOOD BY AUTOMATED COUNT: 12.9 % (ref 12.3–15.4)
GLUCOSE SERPL-MCNC: 110 MG/DL (ref 65–99)
HCT VFR BLD AUTO: 45.8 % (ref 37.5–51)
HGB BLD-MCNC: 14.6 G/DL (ref 13–17.7)
IMM GRANULOCYTES # BLD AUTO: 0.01 10*3/MM3 (ref 0–0.05)
IMM GRANULOCYTES NFR BLD AUTO: 0.2 % (ref 0–0.5)
LYMPHOCYTES # BLD AUTO: 2.18 10*3/MM3 (ref 0.7–3.1)
LYMPHOCYTES NFR BLD AUTO: 38 % (ref 19.6–45.3)
MCH RBC QN AUTO: 29.1 PG (ref 26.6–33)
MCHC RBC AUTO-ENTMCNC: 31.9 G/DL (ref 31.5–35.7)
MCV RBC AUTO: 91.2 FL (ref 79–97)
MONOCYTES # BLD AUTO: 0.44 10*3/MM3 (ref 0.1–0.9)
MONOCYTES NFR BLD AUTO: 7.7 % (ref 5–12)
NEUTROPHILS NFR BLD AUTO: 2.76 10*3/MM3 (ref 1.7–7)
NEUTROPHILS NFR BLD AUTO: 48 % (ref 42.7–76)
NRBC BLD AUTO-RTO: 0 /100 WBC (ref 0–0.2)
PLATELET # BLD AUTO: 319 10*3/MM3 (ref 140–450)
PMV BLD AUTO: 9.8 FL (ref 6–12)
POTASSIUM SERPL-SCNC: 3.7 MMOL/L (ref 3.5–5.2)
RBC # BLD AUTO: 5.02 10*6/MM3 (ref 4.14–5.8)
SODIUM SERPL-SCNC: 136 MMOL/L (ref 136–145)
WBC NRBC COR # BLD AUTO: 5.74 10*3/MM3 (ref 3.4–10.8)

## 2025-04-26 PROCEDURE — 93005 ELECTROCARDIOGRAM TRACING: CPT | Performed by: ORTHOPAEDIC SURGERY

## 2025-04-26 PROCEDURE — 36415 COLL VENOUS BLD VENIPUNCTURE: CPT

## 2025-04-26 PROCEDURE — 80048 BASIC METABOLIC PNL TOTAL CA: CPT

## 2025-04-26 PROCEDURE — 85025 COMPLETE CBC W/AUTO DIFF WBC: CPT

## 2025-04-28 LAB
QT INTERVAL: 385 MS
QTC INTERVAL: 409 MS

## 2025-04-29 RX ORDER — CETIRIZINE HYDROCHLORIDE 10 MG/1
10 TABLET ORAL DAILY PRN
COMMUNITY

## 2025-05-08 ENCOUNTER — ANESTHESIA EVENT (OUTPATIENT)
Dept: PERIOP | Facility: HOSPITAL | Age: 55
End: 2025-05-08
Payer: COMMERCIAL

## 2025-05-08 RX ORDER — CEPHALEXIN 500 MG/1
500 CAPSULE ORAL 4 TIMES DAILY
Qty: 4 CAPSULE | Refills: 0 | Status: SHIPPED | OUTPATIENT
Start: 2025-05-08 | End: 2025-05-09

## 2025-05-08 RX ORDER — NAPROXEN 500 MG/1
500 TABLET ORAL 2 TIMES DAILY WITH MEALS
Qty: 28 TABLET | Refills: 0 | Status: SHIPPED | OUTPATIENT
Start: 2025-05-08

## 2025-05-08 RX ORDER — PROMETHAZINE HYDROCHLORIDE 12.5 MG/1
12.5 TABLET ORAL EVERY 6 HOURS PRN
Qty: 21 TABLET | Refills: 1 | Status: SHIPPED | OUTPATIENT
Start: 2025-05-08

## 2025-05-08 NOTE — H&P
[facility]   HISTORY AND PHYSICAL    Patient Name: Oliver Leone  : 1970  MRN: [id number]  Primary Care Physician:  Carey Miles APRN  Date of admission: (Not on file)    Subjective   Subjective     Chief Complaint: Right knee pain    This is a 54-year-old gentleman with right knee pain presenting for knee arthroscopy and meniscectomy        Review of Systems   Cardiovascular:  Negative for chest pain.   Musculoskeletal:  Positive for arthralgias.        Personal History     Past Medical History:   Diagnosis Date    Ankle sprain     Sports    Arthritis of neck     Sports injury    Fracture, femur     Skiing    Fracture, finger     Sports injury    Headache     HL (hearing loss)     Hypertension     Knee sprain     Work    Knee swelling     Career caused    Low back strain     Work related    Neck strain     Sports injury    Tear of meniscus of knee     Basketball       Past Surgical History:   Procedure Laterality Date    KNEE SURGERY      R side    NECK SURGERY      Sports    TONSILLECTOMY         Family History: family history includes Alcohol abuse in his father; COPD in his maternal grandfather and maternal grandmother; Clotting disorder in his mother; Diabetes in his brother, father, mother, and sister; Heart disease in his father and mother. Otherwise pertinent FHx was reviewed and not pertinent to current issue.    Social History:  reports that he quit smoking about 4 years ago. His smoking use included cigars and cigarettes. He started smoking about 19 years ago. He has a 3.8 pack-year smoking history. He has been exposed to tobacco smoke. His smokeless tobacco use includes snuff. He reports that he does not drink alcohol and does not use drugs.    Home Medications:  amLODIPine, aspirin, cetirizine, and multivitamin with minerals    Allergies:  Allergies   Allergen Reactions    Codeine Anaphylaxis    Hydrocodone Shortness Of Breath     Oxycodone Shortness Of Breath       Objective    Objective     Right knee demonstrates healed arthroscopic incisions mild to moderate medial joint line tenderness mild knee effusion knee range of motion 0 to 125 degrees no varus valgus laxity negative Lachman anterior posterior drawers.  Mild pain on medial lateral Francie.  Sensory and motor exam are intact in all distributions. Dorsalis pedis and posterior tibialis pulses are palpable and capillary refill is less than two seconds to all digits.    Alex Hart MD

## 2025-05-09 ENCOUNTER — ANESTHESIA (OUTPATIENT)
Dept: PERIOP | Facility: HOSPITAL | Age: 55
End: 2025-05-09
Payer: COMMERCIAL

## 2025-05-09 ENCOUNTER — HOSPITAL ENCOUNTER (OUTPATIENT)
Facility: HOSPITAL | Age: 55
Setting detail: HOSPITAL OUTPATIENT SURGERY
Discharge: HOME | End: 2025-05-09
Attending: ORTHOPAEDIC SURGERY | Admitting: ORTHOPAEDIC SURGERY
Payer: COMMERCIAL

## 2025-05-09 VITALS
SYSTOLIC BLOOD PRESSURE: 130 MMHG | TEMPERATURE: 97.6 F | BODY MASS INDEX: 34.91 KG/M2 | WEIGHT: 263.4 LBS | RESPIRATION RATE: 19 BRPM | HEIGHT: 73 IN | OXYGEN SATURATION: 93 % | HEART RATE: 78 BPM | DIASTOLIC BLOOD PRESSURE: 76 MMHG

## 2025-05-09 DIAGNOSIS — M23.303 DERANGEMENT OF MEDIAL MENISCUS OF RIGHT KNEE: ICD-10-CM

## 2025-05-09 PROCEDURE — 25010000002 BUPIVACAINE (PF) 0.25 % SOLUTION 30 ML VIAL: Performed by: ORTHOPAEDIC SURGERY

## 2025-05-09 PROCEDURE — 25010000002 DEXAMETHASONE PER 1 MG: Performed by: NURSE ANESTHETIST, CERTIFIED REGISTERED

## 2025-05-09 PROCEDURE — 25010000002 LIDOCAINE 1 % SOLUTION 10 ML VIAL: Performed by: ORTHOPAEDIC SURGERY

## 2025-05-09 PROCEDURE — 25010000002 ONDANSETRON PER 1 MG: Performed by: NURSE ANESTHETIST, CERTIFIED REGISTERED

## 2025-05-09 PROCEDURE — 25810000003 LACTATED RINGERS PER 1000 ML: Performed by: NURSE ANESTHETIST, CERTIFIED REGISTERED

## 2025-05-09 PROCEDURE — 25010000002 GLYCOPYRROLATE 0.2 MG/ML SOLUTION: Performed by: NURSE ANESTHETIST, CERTIFIED REGISTERED

## 2025-05-09 PROCEDURE — 25010000002 EPINEPHRINE PER 0.1 MG: Performed by: ORTHOPAEDIC SURGERY

## 2025-05-09 PROCEDURE — 25010000002 CEFAZOLIN PER 500 MG: Performed by: ORTHOPAEDIC SURGERY

## 2025-05-09 PROCEDURE — 25010000002 LIDOCAINE PF 1% 1 % SOLUTION: Performed by: NURSE ANESTHETIST, CERTIFIED REGISTERED

## 2025-05-09 PROCEDURE — 25010000002 KETOROLAC TROMETHAMINE PER 15 MG: Performed by: ORTHOPAEDIC SURGERY

## 2025-05-09 PROCEDURE — 25010000002 FENTANYL CITRATE (PF) 50 MCG/ML SOLUTION: Performed by: NURSE ANESTHETIST, CERTIFIED REGISTERED

## 2025-05-09 PROCEDURE — 25010000002 LIDOCAINE 1% - EPINEPHRINE 1:100000 1 %-1:100000 SOLUTION 20 ML VIAL: Performed by: ORTHOPAEDIC SURGERY

## 2025-05-09 PROCEDURE — 29880 ARTHRS KNE SRG MNISECTMY M&L: CPT | Performed by: ORTHOPAEDIC SURGERY

## 2025-05-09 PROCEDURE — 25010000002 MIDAZOLAM PER 1 MG: Performed by: NURSE ANESTHETIST, CERTIFIED REGISTERED

## 2025-05-09 PROCEDURE — 29880 ARTHRS KNE SRG MNISECTMY M&L: CPT | Performed by: PHYSICIAN ASSISTANT

## 2025-05-09 PROCEDURE — 25010000002 PROPOFOL 10 MG/ML EMULSION: Performed by: NURSE ANESTHETIST, CERTIFIED REGISTERED

## 2025-05-09 PROCEDURE — 25810000003 LACTATED RINGERS PER 1000 ML: Performed by: ANESTHESIOLOGY

## 2025-05-09 RX ORDER — DROPERIDOL 2.5 MG/ML
0.62 INJECTION, SOLUTION INTRAMUSCULAR; INTRAVENOUS
Status: DISCONTINUED | OUTPATIENT
Start: 2025-05-09 | End: 2025-05-09 | Stop reason: HOSPADM

## 2025-05-09 RX ORDER — GLYCOPYRROLATE 0.2 MG/ML
INJECTION INTRAMUSCULAR; INTRAVENOUS AS NEEDED
Status: DISCONTINUED | OUTPATIENT
Start: 2025-05-09 | End: 2025-05-09 | Stop reason: SURG

## 2025-05-09 RX ORDER — SODIUM CHLORIDE 0.9 % (FLUSH) 0.9 %
10 SYRINGE (ML) INJECTION AS NEEDED
Status: DISCONTINUED | OUTPATIENT
Start: 2025-05-09 | End: 2025-05-09 | Stop reason: HOSPADM

## 2025-05-09 RX ORDER — SODIUM CHLORIDE, SODIUM LACTATE, POTASSIUM CHLORIDE, CALCIUM CHLORIDE 600; 310; 30; 20 MG/100ML; MG/100ML; MG/100ML; MG/100ML
INJECTION, SOLUTION INTRAVENOUS CONTINUOUS PRN
Status: DISCONTINUED | OUTPATIENT
Start: 2025-05-09 | End: 2025-05-09 | Stop reason: SURG

## 2025-05-09 RX ORDER — NALOXONE HCL 0.4 MG/ML
0.2 VIAL (ML) INJECTION AS NEEDED
Status: DISCONTINUED | OUTPATIENT
Start: 2025-05-09 | End: 2025-05-09 | Stop reason: HOSPADM

## 2025-05-09 RX ORDER — DEXAMETHASONE SODIUM PHOSPHATE 4 MG/ML
INJECTION, SOLUTION INTRA-ARTICULAR; INTRALESIONAL; INTRAMUSCULAR; INTRAVENOUS; SOFT TISSUE AS NEEDED
Status: DISCONTINUED | OUTPATIENT
Start: 2025-05-09 | End: 2025-05-09 | Stop reason: SURG

## 2025-05-09 RX ORDER — ONDANSETRON 2 MG/ML
4 INJECTION INTRAMUSCULAR; INTRAVENOUS ONCE AS NEEDED
Status: DISCONTINUED | OUTPATIENT
Start: 2025-05-09 | End: 2025-05-09 | Stop reason: HOSPADM

## 2025-05-09 RX ORDER — KETOROLAC TROMETHAMINE 30 MG/ML
INJECTION, SOLUTION INTRAMUSCULAR; INTRAVENOUS
Status: DISCONTINUED
Start: 2025-05-09 | End: 2025-05-09 | Stop reason: HOSPADM

## 2025-05-09 RX ORDER — FLUMAZENIL 0.1 MG/ML
0.2 INJECTION INTRAVENOUS AS NEEDED
Status: DISCONTINUED | OUTPATIENT
Start: 2025-05-09 | End: 2025-05-09 | Stop reason: HOSPADM

## 2025-05-09 RX ORDER — LIDOCAINE HYDROCHLORIDE 10 MG/ML
INJECTION, SOLUTION INFILTRATION; PERINEURAL
Status: DISCONTINUED
Start: 2025-05-09 | End: 2025-05-09 | Stop reason: HOSPADM

## 2025-05-09 RX ORDER — PROMETHAZINE HYDROCHLORIDE 25 MG/1
25 SUPPOSITORY RECTAL ONCE AS NEEDED
Status: DISCONTINUED | OUTPATIENT
Start: 2025-05-09 | End: 2025-05-09 | Stop reason: HOSPADM

## 2025-05-09 RX ORDER — DIPHENHYDRAMINE HYDROCHLORIDE 50 MG/ML
12.5 INJECTION, SOLUTION INTRAMUSCULAR; INTRAVENOUS
Status: DISCONTINUED | OUTPATIENT
Start: 2025-05-09 | End: 2025-05-09 | Stop reason: HOSPADM

## 2025-05-09 RX ORDER — EPINEPHRINE 1 MG/ML
INJECTION, SOLUTION, CONCENTRATE INTRAVENOUS
Status: DISCONTINUED
Start: 2025-05-09 | End: 2025-05-09 | Stop reason: HOSPADM

## 2025-05-09 RX ORDER — LIDOCAINE HYDROCHLORIDE 10 MG/ML
INJECTION, SOLUTION EPIDURAL; INFILTRATION; INTRACAUDAL; PERINEURAL AS NEEDED
Status: DISCONTINUED | OUTPATIENT
Start: 2025-05-09 | End: 2025-05-09 | Stop reason: SURG

## 2025-05-09 RX ORDER — ONDANSETRON 2 MG/ML
INJECTION INTRAMUSCULAR; INTRAVENOUS AS NEEDED
Status: DISCONTINUED | OUTPATIENT
Start: 2025-05-09 | End: 2025-05-09 | Stop reason: SURG

## 2025-05-09 RX ORDER — FENTANYL CITRATE 50 UG/ML
INJECTION, SOLUTION INTRAMUSCULAR; INTRAVENOUS AS NEEDED
Status: DISCONTINUED | OUTPATIENT
Start: 2025-05-09 | End: 2025-05-09 | Stop reason: SURG

## 2025-05-09 RX ORDER — IPRATROPIUM BROMIDE AND ALBUTEROL SULFATE 2.5; .5 MG/3ML; MG/3ML
3 SOLUTION RESPIRATORY (INHALATION) ONCE AS NEEDED
Status: DISCONTINUED | OUTPATIENT
Start: 2025-05-09 | End: 2025-05-09 | Stop reason: HOSPADM

## 2025-05-09 RX ORDER — LIDOCAINE HYDROCHLORIDE AND EPINEPHRINE 10; 10 MG/ML; UG/ML
INJECTION, SOLUTION INFILTRATION; PERINEURAL
Status: DISCONTINUED
Start: 2025-05-09 | End: 2025-05-09 | Stop reason: HOSPADM

## 2025-05-09 RX ORDER — PROMETHAZINE HYDROCHLORIDE 25 MG/1
25 TABLET ORAL ONCE AS NEEDED
Status: DISCONTINUED | OUTPATIENT
Start: 2025-05-09 | End: 2025-05-09 | Stop reason: HOSPADM

## 2025-05-09 RX ORDER — SODIUM CHLORIDE, SODIUM LACTATE, POTASSIUM CHLORIDE, CALCIUM CHLORIDE 600; 310; 30; 20 MG/100ML; MG/100ML; MG/100ML; MG/100ML
9 INJECTION, SOLUTION INTRAVENOUS CONTINUOUS PRN
Status: DISCONTINUED | OUTPATIENT
Start: 2025-05-09 | End: 2025-05-09 | Stop reason: HOSPADM

## 2025-05-09 RX ORDER — SODIUM CHLORIDE 0.9 % (FLUSH) 0.9 %
10 SYRINGE (ML) INJECTION EVERY 12 HOURS SCHEDULED
Status: DISCONTINUED | OUTPATIENT
Start: 2025-05-09 | End: 2025-05-09 | Stop reason: HOSPADM

## 2025-05-09 RX ORDER — MIDAZOLAM HYDROCHLORIDE 1 MG/ML
INJECTION, SOLUTION INTRAMUSCULAR; INTRAVENOUS AS NEEDED
Status: DISCONTINUED | OUTPATIENT
Start: 2025-05-09 | End: 2025-05-09 | Stop reason: SURG

## 2025-05-09 RX ORDER — PROPOFOL 10 MG/ML
VIAL (ML) INTRAVENOUS AS NEEDED
Status: DISCONTINUED | OUTPATIENT
Start: 2025-05-09 | End: 2025-05-09 | Stop reason: SURG

## 2025-05-09 RX ORDER — FENTANYL CITRATE 50 UG/ML
50 INJECTION, SOLUTION INTRAMUSCULAR; INTRAVENOUS
Status: DISCONTINUED | OUTPATIENT
Start: 2025-05-09 | End: 2025-05-09 | Stop reason: HOSPADM

## 2025-05-09 RX ORDER — BUPIVACAINE HYDROCHLORIDE 2.5 MG/ML
INJECTION, SOLUTION EPIDURAL; INFILTRATION; INTRACAUDAL; PERINEURAL
Status: DISCONTINUED
Start: 2025-05-09 | End: 2025-05-09 | Stop reason: HOSPADM

## 2025-05-09 RX ORDER — ATROPINE SULFATE 0.4 MG/ML
0.4 INJECTION, SOLUTION INTRAMUSCULAR; INTRAVENOUS; SUBCUTANEOUS ONCE AS NEEDED
Status: DISCONTINUED | OUTPATIENT
Start: 2025-05-09 | End: 2025-05-09 | Stop reason: HOSPADM

## 2025-05-09 RX ADMIN — DEXAMETHASONE SODIUM PHOSPHATE 4 MG: 4 INJECTION, SOLUTION INTRAMUSCULAR; INTRAVENOUS at 07:36

## 2025-05-09 RX ADMIN — SODIUM CHLORIDE, SODIUM LACTATE, POTASSIUM CHLORIDE, AND CALCIUM CHLORIDE: .6; .31; .03; .02 INJECTION, SOLUTION INTRAVENOUS at 07:21

## 2025-05-09 RX ADMIN — ONDANSETRON 4 MG: 2 INJECTION INTRAMUSCULAR; INTRAVENOUS at 07:47

## 2025-05-09 RX ADMIN — FENTANYL CITRATE 50 MCG: 50 INJECTION, SOLUTION INTRAMUSCULAR; INTRAVENOUS at 07:36

## 2025-05-09 RX ADMIN — FENTANYL CITRATE 50 MCG: 50 INJECTION, SOLUTION INTRAMUSCULAR; INTRAVENOUS at 08:30

## 2025-05-09 RX ADMIN — MIDAZOLAM 2 MG: 1 INJECTION INTRAMUSCULAR; INTRAVENOUS at 07:24

## 2025-05-09 RX ADMIN — FENTANYL CITRATE 50 MCG: 50 INJECTION, SOLUTION INTRAMUSCULAR; INTRAVENOUS at 07:26

## 2025-05-09 RX ADMIN — GLYCOPYRROLATE 0.1 MG: 0.2 INJECTION, SOLUTION INTRAMUSCULAR; INTRAVENOUS at 07:24

## 2025-05-09 RX ADMIN — LIDOCAINE HYDROCHLORIDE 50 MG: 10 INJECTION, SOLUTION EPIDURAL; INFILTRATION; INTRACAUDAL; PERINEURAL at 07:26

## 2025-05-09 RX ADMIN — PROPOFOL 250 MG: 10 INJECTION, EMULSION INTRAVENOUS at 07:26

## 2025-05-09 RX ADMIN — SODIUM CHLORIDE, POTASSIUM CHLORIDE, SODIUM LACTATE AND CALCIUM CHLORIDE 9 ML/HR: 600; 310; 30; 20 INJECTION, SOLUTION INTRAVENOUS at 06:09

## 2025-05-09 RX ADMIN — CEFAZOLIN 2 G: 2 INJECTION, POWDER, FOR SOLUTION INTRAMUSCULAR; INTRAVENOUS at 07:16

## 2025-05-09 NOTE — ANESTHESIA PREPROCEDURE EVALUATION
Anesthesia Evaluation     Patient summary reviewed and Nursing notes reviewed   no history of anesthetic complications:   NPO Solid Status: > 8 hours  NPO Liquid Status: > 2 hours           Airway   Dental      Pulmonary    (+) a smoker Former,  Cardiovascular     ECG reviewed  PT is on anticoagulation therapy    (+) hypertension      Neuro/Psych  (+) headaches, tremors, numbness, psychiatric history Anxiety  GI/Hepatic/Renal/Endo    (+) obesity    Musculoskeletal     (+) back pain, radiculopathy  Abdominal    Substance History      OB/GYN          Other   arthritis,     ROS/Med Hx Other: Additional History:  Allergies ,chest pain    PSH:  KNEE SURGERY TONSILLECTOMY  NECK SURGERY               Anesthesia Plan    ASA 2     general     (Patient identified; pre-operative vital signs, all relevant labs/studies, complete medical/surgical/anesthetic history, full medication list, full allergy list, and NPO status obtained/reviewed; physical assessment performed; anesthetic options, side effects, potential complications, risks, and benefits discussed; questions answered; verbal and/or written anesthesia consent obtained; patient cleared for procedure; anesthesia machine and equipment checked and functioning)  intravenous induction     Anesthetic plan, risks, benefits, and alternatives have been provided, discussed and informed consent has been obtained with: patient.    Use of blood products discussed with  Consented to blood products.    Plan discussed with CRNA and CAA.    CODE STATUS:

## 2025-05-09 NOTE — ANESTHESIA PROCEDURE NOTES
Airway  Reason: elective    Date/Time: 5/9/2025 7:27 AM  Airway not difficult    General Information and Staff    Patient location during procedure: OR  CRNA/CAA: Raulito Chen CRNA    Indications and Patient Condition    Preoxygenated: yes      Final Airway Details    Final airway type: supraglottic airway      Successful airway: classic and I-gel  Size: 5   Number of attempts at approach: 1  Assessment: lips, teeth, and gum same as pre-op

## 2025-05-09 NOTE — OP NOTE
KNEE ARTHROSCOPY  Procedure Report    Patient Name:  Oliver Leone  YOB: 1970    Date of Surgery:  5/9/2025     Indications: This is a 54 y.o. male with pain to the right knee.  Imaging demonstrated medial meniscus tear.Treatment options were discussed.  They desired to proceed with knee arthroscopy and meniscectomy after discussing the risks including bleeding, scarring,infection, stiffness, nerve damage, tendon damage, artery damage, continued pain, DVT, loss of life or limb, and a need for further surgery.      Pre-op Diagnosis:   Derangement of medial meniscus of right knee [M23.303]       Post-op Diagnosis:    Same plus lateral meniscus tear and global grade 3 changes medial femoral condyle and 2 x 2 grade 4 fissure lateral femoral condyle and lateral tibial plateau    Procedure/CPT® Codes: 41631    Procedure(s): Right  KNEE ARTHROSCOPY with partial medial lateral meniscectomy    Assistant: Thor Campos physician assistant    was responsible for performing the following activities: Retraction, Suction, Irrigation, Suturing, Closing, and Placing Dressing and their skilled assistance was necessary for the success of this case.         Anesthesia: General    IVF: See anesthesia record    Estimated Blood Loss: minimal    Implants:    None    Tourniquet: None      Complications: None    Specimens:none    Description of Procedure: The patient's operative site was marked.  Patient was brought to the operating room and placed on the operating room table.  General anesthesia was administered. Antibiotics were dosed.  A timeout was taken to confirm the correct operative site and procedure.  Examination under anesthesia indicated full range of motion, no varus or valgus instability, negative Lachman, negative anterior drawer and negative pivot shift.  The right knee was prepped and draped in the standard surgical fashion. The knee and portals were  injected with local anesthetic.  Portals created.  Camera was inserted.  The suprapatellar area demonstrated no loose body or synovitis.  The patellofemoral joint was intact.  The gutters demonstrated no loose body or synovitis.  The medial compartment was probed and demonstrated complex tear of the posterior horn of the medial meniscus resected with a shaver and biter stable tissue global grade 3 changes of the condyle chondroplasty was performed.  The notch was entered. The ACL was intact.  The PCL was intact.  The lateral compartment was probed and found large radial tear of the lateral meniscus resected with a shaver to stable tissue 2 x 2 grade 4 fissures were noted on the condyle and plateau.       Any remaining debris and fluid were removed and the wounds were closed with suture and Steri-Strips and 30 mg of Toradol and lidocaine were injected in the knee.  A sterile dressing was applied.  Patient was awakened and taken to the recovery room.  There were no complications.  I was present for all portions.  Counts were correct.  Good capillary refill was noted of the foot.

## 2025-05-09 NOTE — ANESTHESIA POSTPROCEDURE EVALUATION
Patient: Oliver Leone    Procedure Summary       Date: 05/09/25 Room / Location: [facility] OR 03 / [facility] MAIN OR    Anesthesia Start: 0721 Anesthesia Stop: 0801    Procedure: KNEE ARTHROSCOPY with partial medial lateral meniscectomy (Right: Knee) Diagnosis:       Derangement of medial meniscus of right knee      (Derangement of medial meniscus of right knee [M23.303])    Surgeons: Alex Hart MD Provider: Hu Don MD    Anesthesia Type: general ASA Status: 2            Anesthesia Type: general    Vitals  Vitals Value Taken Time   /63 05/09/25 08:05   Temp     Pulse 82 05/09/25 08:05   Resp     SpO2 94 % 05/09/25 08:05   Vitals shown include unfiled device data.        Post Anesthesia Care and Evaluation    Patient location during evaluation: PACU  Patient participation: complete - patient participated  Level of consciousness: awake  Pain scale: See nurse's notes for pain score.  Pain management: adequate    Airway patency: patent  Anesthetic complications: No anesthetic complications  PONV Status: none  Cardiovascular status: acceptable  Respiratory status: acceptable and spontaneous ventilation  Hydration status: acceptable    Comments: Patient seen and examined postoperatively; vital signs stable; SpO2 greater than or equal to 90%; cardiopulmonary status stable; nausea/vomiting adequately controlled; pain adequately controlled; no apparent anesthesia complications; patient discharged from anesthesia care when discharge criteria were met

## 2025-05-12 ENCOUNTER — OFFICE VISIT (OUTPATIENT)
Dept: ORTHOPEDIC SURGERY | Facility: CLINIC | Age: 55
End: 2025-05-12
Payer: COMMERCIAL

## 2025-05-12 VITALS — WEIGHT: 263 LBS | BODY MASS INDEX: 34.85 KG/M2 | OXYGEN SATURATION: 98 % | HEIGHT: 73 IN

## 2025-05-12 DIAGNOSIS — Z47.89 ORTHOPEDIC AFTERCARE: Primary | ICD-10-CM

## 2025-05-12 PROCEDURE — 99024 POSTOP FOLLOW-UP VISIT: CPT | Performed by: ORTHOPAEDIC SURGERY

## 2025-05-12 NOTE — PROGRESS NOTES
"     Patient ID: Oliver Leone is a 54 y.o. male.  5/9/25 right knee arthroscopy partial medial and lateral meniscectomy with grade 4 changes of medial and lateral femoral condyle  Pain moderate    Objective:    Ht 185.4 cm (73\")   Wt 119 kg (263 lb)   SpO2 98%   BMI 34.70 kg/m²     Physical Examination:  Wounds are well approximated without infection.  Trace effusion, Emanuel negative. Sensory and motor exam are intact in all distributions. Dorsalis pedis and posterior tibialis pulses are palpable and capillary refill is less than two seconds to all digits.       Imaging:      Assessment:  Doing well  After knee arthroscopy  Plan:  Wounds were cleaned and redressed. Restrictions discussed. Begin home exercises. See me in 4 weeks. Remove dressing in 2 weeks.  "

## 2025-05-19 DIAGNOSIS — I15.8 OTHER SECONDARY HYPERTENSION: Chronic | ICD-10-CM

## 2025-05-19 RX ORDER — AMLODIPINE BESYLATE 10 MG/1
10 TABLET ORAL DAILY
Qty: 90 TABLET | Refills: 1 | Status: SHIPPED | OUTPATIENT
Start: 2025-05-19

## 2025-06-09 ENCOUNTER — OFFICE VISIT (OUTPATIENT)
Dept: ORTHOPEDIC SURGERY | Facility: CLINIC | Age: 55
End: 2025-06-09
Payer: COMMERCIAL

## 2025-06-09 VITALS — OXYGEN SATURATION: 98 % | BODY MASS INDEX: 34.85 KG/M2 | HEIGHT: 73 IN | WEIGHT: 263 LBS

## 2025-06-09 DIAGNOSIS — Z47.89 ORTHOPEDIC AFTERCARE: Primary | ICD-10-CM

## 2025-06-09 PROCEDURE — 99024 POSTOP FOLLOW-UP VISIT: CPT | Performed by: PHYSICIAN ASSISTANT

## 2025-06-09 NOTE — PROGRESS NOTES
Patient ID: Oliver Leone is a 54 y.o. male.  History of Present Illness  The patient presents today for follow-up on right knee arthroscopy with meniscectomy, which was performed on 05/09/2025 along the medial and lateral aspects, with findings of grade 4 changes of osteoarthritis of the medial and lateral femoral condyles.    He reports persistent swelling and knotting around the surgical scar. His occupation involves frequent ladder use and prolonged standing, which he has been managing by pacing himself as advised by his physician. He has been engaging in pool exercises but does not anticipate being able to run within the next few weeks due to his weight of approximately 260 pounds. He experiences difficulty when transitioning his full weight onto his right leg while using a ladder. He is seeking an extension of his short-term disability for a few more weeks beyond the initially scheduled period.    He has recently started isolated pool exercises, which result in swelling after deep squats. He was unable to bear weight on his leg until a week ago. He has been applying an ice pack intermittently, which also causes swelling. He is considering resuming light weight squats at the gym but is hesitant due to psychological barriers. He plans to continue pool exercises for another 1 to 2 weeks before gradually increasing his activity level.    He also reports numbness in his right toe since the surgery, which he describes as intermittent and unusual. He has not worn heavy boots or confined socks in the past month and has been wearing slides. He plans to start leg extension, leg curl, and leg press exercises with light weights outside of the pool from 06/27/2025, which will be 6 weeks post-surgery. He is not currently on worker's compensation. He discontinued naproxen approximately 7 days post-surgery.    SOCIAL HISTORY  Occupations: Performs inspections and investigations, requires frequent use of ladders and  "prolonged periods on feet.  Exercise: Works in the pool, plans to start leg exercises outside of the pool.    Objective:    Ht 185.4 cm (73\")   Wt 119 kg (263 lb)   SpO2 98%   BMI 34.70 kg/m²     Physical Examination:  Physical Exam  Musculoskeletal:  Right knee: Intact skin with no apparent effusion. Tenderness over the port incisions, both medial and lateral. Extension at 0 degrees and flexion at 125 degrees with pain. No stiffness. Positive retropatellar crepitus. Tenderness over the lateral aspect of the gluteal space. No medial joint line tenderness. Trace laxity with positive valgus stress. Positive lateral Francie test.    Imaging:  None new    Assessment:  Diagnoses and all orders for this visit:    1. Orthopedic aftercare (Primary)     Plan:  Assessment & Plan  1. Postoperative status following right knee arthroscopy with meniscectomy:    The observed nodule swelling is likely due to the formation of a cyst around the dissolved Vicryl sutures, which typically occurs within 4 to 6 weeks post-surgery. There is also some external scarring present outside the joint space, which may persist. Arthritis could potentially exacerbate existing symptoms, although these should subside over time. The numbness in the right toe could be attributed to a faint nerve that supplies superficial light touch sensation, a condition that usually resolves within a year. Scar massage in various axes is recommended to alleviate the pulling sensation. A steroid injection was discussed as a potential treatment option if symptoms persist beyond 12 weeks post-surgery. The possibility of a gel injection was also mentioned, but due to insurance provider's policy, this is likely not covered by [facility]. Remain off work until 07/10/2025.    Follow-up  The patient will follow up on 07/10/2025.      Patient or patient representative verbalized consent for the use of Ambient Listening during the visit with  Thor BLEVINS" DEMETRIS Campos for chart documentation. 6/9/2025  10:13 EDT  Procedures

## 2025-07-10 ENCOUNTER — OFFICE VISIT (OUTPATIENT)
Dept: ORTHOPEDIC SURGERY | Facility: CLINIC | Age: 55
End: 2025-07-10
Payer: COMMERCIAL

## 2025-07-10 VITALS — BODY MASS INDEX: 34.7 KG/M2 | RESPIRATION RATE: 18 BRPM | OXYGEN SATURATION: 96 % | HEIGHT: 73 IN | HEART RATE: 105 BPM

## 2025-07-10 DIAGNOSIS — M17.11 PRIMARY LOCALIZED OSTEOARTHRITIS OF RIGHT KNEE: ICD-10-CM

## 2025-07-10 DIAGNOSIS — Z47.89 ORTHOPEDIC AFTERCARE: Primary | ICD-10-CM

## 2025-07-10 PROCEDURE — 99024 POSTOP FOLLOW-UP VISIT: CPT | Performed by: PHYSICIAN ASSISTANT

## 2025-07-10 NOTE — PROGRESS NOTES
"   Patient ID: Oliver Leone is a 54 y.o. male  History of Present Illness  The patient returns today for further follow-up on right knee arthroscopy with meniscectomy of the medial and lateral aspects and findings of grade 3 and grade 4 osteoarthritis of the medial and lateral femoral condyles, performed on 05/09/2025.    He reports some edema in his ankle and foot after some exercises and strengthening at the gym during his own rehab. He has been doing deeper squats in the pool for strengthening as well. He plans to gradually increase his exercise intensity over the next 6 weeks, starting with light to medium exercises. After this period, he intends to assess his pain levels before proceeding with a week of medium-intensity exercises. His strategy involves alternating between 2 weeks of light exercise and 1 week of heavy exercise, with the aim of improving leg stabilization. He also mentions that he will avoid putting pressure on his ankles during certain exercises, such as bench press or pull-downs, by keeping them elevated. If he encounters any issues, he will contact us for further evaluation and potential injection therapy.    SOCIAL HISTORY  Exercise: Exercises at the gym, performs deeper squats in the pool, alternating light to medium exercises for 6 weeks, then medium for a week, followed by light for 2 weeks, heavy for a week, and continues this pattern for leg stabilization.    Objective:  Pulse 105   Resp 18   Ht 185.4 cm (73\")   SpO2 96%   BMI 34.70 kg/m²     Physical Examination:       Physical Exam  Musculoskeletal:  Right knee: Intact skin, well healed incisions, no signs of infection, mild tenderness along the medial aspect, mild quadriceps atrophy compared to the left, negative retropatellar crepitus, flexion to 125+  Calf: Negative tenderness  Ankle: Range of motion grossly intact  Hip: Range of motion grossly intact  Others: Negative straight leg raise, dorsalis pedis pulses " palpable    Imaging:   None new    Assessment:    Diagnoses and all orders for this visit:    1. Orthopedic aftercare (Primary)    2. Primary localized osteoarthritis of right knee    Plan:   Assessment & Plan  1. Post-operative follow-up for right knee arthroscopy with meniscectomy:  He reports some edema in his ankle and foot after exercises and strengthening at the gym. Physical examination reveals intact skin, well-healed incisions, mild tenderness along the medial aspect, and mildly atrophied quadriceps muscle on the right compared to the left. There is no sign of infection, negative calf tenderness, and negative straight leg raise. Range of motion at the ankle and hip are grossly intact. The dorsalis pedis pulses are palpable, and there is no tenderness about the calf. Extension is zero, with negative retropatellar crepitus and flexion to 125+.    Work restrictions will be adjusted to allow him to return to work without restrictions on 07/14/2025. He will follow a structured exercise regimen: light to medium exercises for 6 weeks, then medium exercises for a week to assess pain levels. If no issues arise, he will alternate between 2 weeks of light exercises and 1 week of heavy exercises to build leg stabilization. He will avoid putting pressure on his ankles during exercises. If problems occur, he will contact the clinic for further evaluation and possible injections.  Follow-up as needed.      Disclaimer: Part of this note may be an electronic transcription/translation of spoken language to printed text using the Dragon Dictation System

## 2025-08-15 ENCOUNTER — OFFICE VISIT (OUTPATIENT)
Dept: FAMILY MEDICINE CLINIC | Facility: CLINIC | Age: 55
End: 2025-08-15
Payer: COMMERCIAL

## 2025-08-15 VITALS
HEIGHT: 73 IN | SYSTOLIC BLOOD PRESSURE: 143 MMHG | DIASTOLIC BLOOD PRESSURE: 87 MMHG | OXYGEN SATURATION: 97 % | TEMPERATURE: 98.1 F | BODY MASS INDEX: 36.84 KG/M2 | WEIGHT: 278 LBS | RESPIRATION RATE: 16 BRPM | HEART RATE: 78 BPM

## 2025-08-15 DIAGNOSIS — I15.8 OTHER SECONDARY HYPERTENSION: Chronic | ICD-10-CM

## 2025-08-15 DIAGNOSIS — B07.9 WART OF HAND: Primary | Chronic | ICD-10-CM

## 2025-08-15 PROCEDURE — 99214 OFFICE O/P EST MOD 30 MIN: CPT | Performed by: NURSE PRACTITIONER

## 2025-08-15 RX ORDER — AMLODIPINE BESYLATE 10 MG/1
10 TABLET ORAL DAILY
Qty: 90 TABLET | Refills: 1 | Status: CANCELLED | OUTPATIENT
Start: 2025-08-15

## 2025-08-15 RX ORDER — AMLODIPINE BESYLATE 10 MG/1
10 TABLET ORAL DAILY
Qty: 90 TABLET | Refills: 1 | Status: SHIPPED | OUTPATIENT
Start: 2025-08-15

## (undated) DEVICE — SYR LUERLOK 30CC

## (undated) DEVICE — NDL HYPO PRECISIONGLIDE REG 22G 1 1/2

## (undated) DEVICE — PAD,ABDOMINAL,5"X9",STERILE,LF,1/PK: Brand: MEDLINE INDUSTRIES, INC.

## (undated) DEVICE — ANTIBACTERIAL UNDYED BRAIDED (POLYGLACTIN 910), SYNTHETIC ABSORBABLE SUTURE: Brand: COATED VICRYL

## (undated) DEVICE — SOLUTION,WATER,IRRIGATION,1000ML,STERILE: Brand: MEDLINE

## (undated) DEVICE — PAD CAST SPECIST 6IN STRL

## (undated) DEVICE — U-DRAPE: Brand: CONVERTORS

## (undated) DEVICE — PK KN ARTHROSCOPY 50

## (undated) DEVICE — UNDERGLV SURG BIOGEL INDICAT PI SZ8 BLU

## (undated) DEVICE — NDL HYPO PRECISIONGLIDE/REG 18G 11/2 PNK

## (undated) DEVICE — COVER,MAYO STAND,STERILE: Brand: MEDLINE

## (undated) DEVICE — SYR LL TP 10ML STRL

## (undated) DEVICE — UNDRGLV SURG BIOGEL PIMICROINDICATOR SYNTH SZ8 LF STRL

## (undated) DEVICE — TBG ARTHSCP PT W CONN/REDUC 8FT

## (undated) DEVICE — DRAPE,U/ SHT,SPLIT,PLAS,STERIL: Brand: MEDLINE

## (undated) DEVICE — GLV SURG SENSICARE PI ORTHO SZ7.5 LF STRL

## (undated) DEVICE — WEBRIL* CAST PADDING: Brand: DEROYAL

## (undated) DEVICE — THE STERILE LIGHT HANDLE COVER IS USED WITH STERIS SURGICAL LIGHTING AND VISUALIZATION SYSTEMS.

## (undated) DEVICE — GAUZE,SPONGE,FLUFF,6"X6.75",STRL,5/TRAY: Brand: MEDLINE

## (undated) DEVICE — UNDERGLV SURG BIOGEL/PI PF SYNTH SURG SZ8.5 BLU 50/BX

## (undated) DEVICE — KT SURG TURNOVER 050

## (undated) DEVICE — 3M™ STERI-STRIP™ REINFORCED ADHESIVE SKIN CLOSURES, R1547, 1/2 IN X 4 IN (12 MM X 100 MM), 6 STRIPS/ENVELOPE: Brand: 3M™ STERI-STRIP™

## (undated) DEVICE — DRAPE SHEET ULTRAGARD: Brand: MEDLINE

## (undated) DEVICE — GLV SURG SENSICARE PI ORTHO SZ8 LF STRL

## (undated) DEVICE — TBG ARTHSCP PUMP W CONN/REDUC 8FT

## (undated) DEVICE — GLOVE,SURG,SENSICARE SLT,LF,PF,8.5: Brand: MEDLINE

## (undated) DEVICE — TOWEL,OR,DSP,ST,WHITE,DLX,4/PK,20PK/CS: Brand: MEDLINE

## (undated) DEVICE — BNDG,ELSTC,MATRIX,STRL,6"X5YD,LF,HOOK&LP: Brand: MEDLINE

## (undated) DEVICE — BLD SHAVER EXCALIBUR CRV 4MM

## (undated) DEVICE — THE STERILE CAMERA HANDLE COVER IS FOR USE WITH THE STERIS SURGICAL LIGHTING AND VISUALIZATION SYSTEMS.

## (undated) DEVICE — TBG ARTHSCP DUALWAVE